# Patient Record
Sex: FEMALE | Race: WHITE | NOT HISPANIC OR LATINO | Employment: OTHER | URBAN - METROPOLITAN AREA
[De-identification: names, ages, dates, MRNs, and addresses within clinical notes are randomized per-mention and may not be internally consistent; named-entity substitution may affect disease eponyms.]

---

## 2024-07-11 ENCOUNTER — EVALUATION (OUTPATIENT)
Dept: PHYSICAL THERAPY | Facility: REHABILITATION | Age: 79
End: 2024-07-11
Payer: COMMERCIAL

## 2024-07-11 DIAGNOSIS — R35.0 URINARY FREQUENCY: ICD-10-CM

## 2024-07-11 DIAGNOSIS — M62.81 MUSCLE WEAKNESS: ICD-10-CM

## 2024-07-11 DIAGNOSIS — R39.15 URINARY URGENCY: Primary | ICD-10-CM

## 2024-07-11 PROCEDURE — 97161 PT EVAL LOW COMPLEX 20 MIN: CPT | Performed by: PHYSICAL THERAPIST

## 2024-07-11 NOTE — LETTER
2024    Tisha Ely MD  [address] [facility]  [address]  [facility] [address]    Patient: Shayla Philip   YOB: 1945   Date of Visit: 2024     Encounter Diagnosis     ICD-10-CM    1. Urinary urgency  R39.15       2. Urinary frequency  R35.0       3. Muscle weakness  M62.81           Dear Dr. Ely:    Thank you for your recent referral of Shayla Philip. Please review the attached evaluation summary from Shayla's recent visit.     Please verify that you agree with the plan of care by signing the attached order.     If you have any questions or concerns, please do not hesitate to call.     I sincerely appreciate the opportunity to share in the care of one of your patients and hope to have another opportunity to work with you in the near future.       Sincerely,    Nelsy Mckoy, PT      Referring Provider:      I certify that I have read the below Plan of Care and certify the need for these services furnished under this plan of treatment while under my care.                    Tisha Ely MD  [address] [place] [id number]  Via Fax: [phone]          PT Evaluation     Today's date: 2024  Patient name: Shayla Philip  : 1945  MRN: [id number]  Referring provider: Nelsy Mckoy, PT  Dx:   Encounter Diagnosis     ICD-10-CM    1. Urinary urgency  R39.15       2. Urinary frequency  R35.0       3. Muscle weakness  M62.81           Start Time: 1103  Stop Time: 1159  Total time in clinic (min): 56 minutes    Assessment  Impairments: impaired balance, impaired physical strength, lacks appropriate home exercise program and pain with function  Symptom irritability: moderate    Assessment details: Patient is a 79 y.o. female presenting to initial examination direct access with chief complaint of urinary urgency and frequency. Internal assessment will be deferred and performed at a later visit  secondary to time constraints. Functional impairments  include urinary urgency, urinary frequency and nocturia. Physical findings include proximal hip weakness, post pelvic floor and OI tenderness. Remaining physical assessment will be completed at a later visit. Patient would benefit from formal physical therapy to address impairments as detailed, decrease pain, and restore maximal level of function for all home and mobility tasks. Educated patient regarding plan of care and answered all patient questions to patient satisfaction. Thank you for this referral.    Understanding of Dx/Px/POC: good     Prognosis: good    Goals  Short term goals:  Pt will be able to delay urge at least 5 minutes in 6 weeks.  Pt will demonstrate good understanding of urge delay techniques in 6 weeks  Pt will extend intervoid period to 2-4 hours in 6 weeks.    Long term goals:  Pt will be compliant with HEP by discharge.  Pt will be able to delay urge for greater than 15 minutes to allow pt to walk in her neighborhood x20 mins for CV endurance and exercise purposes  Pt will present with nocturia 3 times a night or less   Pt will present with MMT 4/5 b/l LE all planes      Plan  Patient would benefit from: skilled physical therapy  Planned modality interventions: biofeedback, cryotherapy and thermotherapy: hydrocollator packs    Planned therapy interventions: joint mobilization, manual therapy, neuromuscular re-education, strengthening, stretching, therapeutic activities, therapeutic exercise, flexibility, functional ROM exercises, abdominal trunk stabilization and balance    Frequency: 1-2x week  Treatment plan discussed with: patient  Plan details: Cont per POC. PT issued bladder diary for pt to document bladder patterns. Pt will initiate treatment NV. PT will send POC to pt's PCP as well as urogynecologist for signature.       PT Pelvic Floor Subjective:   History of Present Illness:   See medication list scanned in media.   Pt goes to [place] in 1 week.     Pt comes to [facility] for urinary  frequency, nocturia, urinary urgency, pelvic pain after a BM. Pt denies urinary incontinence.   Pt has been dealing with symptoms 2 years.  Unsure of cause.   Pt has bladder stimulator, placed last October. She is unsure if it has helped. This was done through [facility].     Pelvic pain at worst: 6/10, after a BM. Will linger 45 mins until her medication works, pyridium    Pt rpeorts R pelvic floor tendrnes during internal assessment with urogyn.  Pt is concerned about her ability to make progress.      Social Support:     Lives in:  Multiple-level home    Lives with:  Spouse    Relationship status: /committed    Work status: retired    Life stress severity: moderate    History of Depression: yes    takes an anxiety pill  Diet and Exercise:    Diet:balanced nutrition    Pt was walking until all of thi happened. If she was away from home she would have to pee so she stopped. She would like to return to walking.   OB/ gyn History    Gestational History:     Prior Pregnancy: Yes      Number of prior pregnancies: 3    Number of term pregnancies: 3    Delivery Type: vaginal delivery      Menstrual History:      Menopausal: menopause  Bladder Function:     Voiding Difficulties positive for: urgency (when away from the house, in the car, at a restaurat), frequent urination, hesitancy, straining and incomplete emptying       Voiding Difficulties comments:     Voiding frequency: every 1-2 hours and every 15-30 minutes    Urinary leakage: no urine leakage    Urinary leakage not aggravated by: post-void dribble    Nocturia (episodes per night): 4 or more (5-9 times a night)    Painful urination: Yes (when I stop peeing she will feel discomfort in her lower pelvis)      Intake (ounces): Water: 8, Tea: 8,     Intake (ounces) comment: If I drink a lot I pee a lot   Bowel Function:     Bowel Function comments:  Pt takes metamucil every 3 days.    Bowel frequency: daily    [place] Stool Scale: type 1, type 5 and type 7    Stool  "softener use: no stool softeners    Enema use: no enema    Uses \"squatty potty\": no Squatty Potty  Sexual Function:     Sexually Active:  Not sexually active  Diagnostic Tests: Pt reports diagnostic testing has been completed 2 years ago, unsure of what:  Treatments:     Current treatment: physical therapy    Patient Goals:     Other patient goals:  Get back to life and not pee all the time or have pain      Objective    Pt provided verbal consent prior to and throughout objective assessment     Posture: nv    Tenderness:  Piriformis:   Obturator Internus: pos b/l  Post pelvic floor: pos b/l  Adductors:    Lumbar AROM - nv     SLS  L: poor load transfer, ipsilateral lean  R: poor load transfer, ipsilateral lean    Other Comments: - nv    MMT:   Left Right   Hip flex 3+ 4-   Hip ABD 3+ 4-   Hip ext 3 3+   Hip IR 4- 4+   Hip ER 4- 4+   Knee flex 4+ 4+   Knee ext 4+ 4+   Ankle DF 4+ 4+               Special Tests: nv   Left Right   JANELLE     FADIR     Post Thigh Thrust     Gillets     ASLR     SI compression     SI distraction                         Precautions: hypertension  Impairments/functional limitations: urinary urgency, urinary frequency, nocturia   Daily Treatment Diary    Manuals 7/11         External assess Prn in future visit         Internal assess Prn in future visit         Objective assessment Complete in next 1-2 visits                   Neuro Re-Ed          TAC          PF          TAC+PF          TAC with march          TAC with alt knee fall out          TAC with ball squeeze          TAC with SLR          TAC with s/l ABD          TAC with bridge                              360 deg breathing          Ther Ex                                                  Seated hs stretch          Seated piri stretch          Supine butterfly stretch                                                  Ther Activity          Bladder Diary  issued         Urge delay:QF nv         Urge delay: HR nv         Freeze, " breathe, squeeze  nv         The chip                     Gait Training                              Modalities

## 2024-07-11 NOTE — LETTER
2024    Apoorva CostaVINEET pisano  [address] [facility] [address]  [facility] [address]    Patient: Shayla Philip   YOB: 1945   Date of Visit: 2024     Encounter Diagnosis     ICD-10-CM    1. Urinary urgency  R39.15       2. Urinary frequency  R35.0       3. Muscle weakness  M62.81           Dear Dr. Costa:    Thank you for your recent referral of Shayla Philip. Please review the attached evaluation summary from Shayla's recent visit.     Please verify that you agree with the plan of care by signing the attached order.     If you have any questions or concerns, please do not hesitate to call.     I sincerely appreciate the opportunity to share in the care of one of your patients and hope to have another opportunity to work with you in the near future.       Sincerely,    Nelsy Mckoy, PT      Referring Provider:      I certify that I have read the below Plan of Care and certify the need for these services furnished under this plan of treatment while under my care.                    VINEET Franco  [address] [facility] [address]  [facility] [address]  Via Fax: [phone]          PT Evaluation     Today's date: 2024  Patient name: Shayla Philip  : 1945  MRN: [id number]  Referring provider: Nelsy Mckoy, PT  Dx:   Encounter Diagnosis     ICD-10-CM    1. Urinary urgency  R39.15       2. Urinary frequency  R35.0       3. Muscle weakness  M62.81           Start Time: 1103  Stop Time: 1159  Total time in clinic (min): 56 minutes    Assessment  Impairments: impaired balance, impaired physical strength, lacks appropriate home exercise program and pain with function  Symptom irritability: moderate    Assessment details: Patient is a 79 y.o. female presenting to initial examination direct access with chief complaint of urinary urgency and frequency. Internal assessment will be deferred and performed at a later visit  secondary to time constraints. Functional  impairments include urinary urgency, urinary frequency and nocturia. Physical findings include proximal hip weakness, post pelvic floor and OI tenderness. Remaining physical assessment will be completed at a later visit. Patient would benefit from formal physical therapy to address impairments as detailed, decrease pain, and restore maximal level of function for all home and mobility tasks. Educated patient regarding plan of care and answered all patient questions to patient satisfaction. Thank you for this referral.    Understanding of Dx/Px/POC: good     Prognosis: good    Goals  Short term goals:  Pt will be able to delay urge at least 5 minutes in 6 weeks.  Pt will demonstrate good understanding of urge delay techniques in 6 weeks  Pt will extend intervoid period to 2-4 hours in 6 weeks.    Long term goals:  Pt will be compliant with HEP by discharge.  Pt will be able to delay urge for greater than 15 minutes to allow pt to walk in her neighborhood x20 mins for CV endurance and exercise purposes  Pt will present with nocturia 3 times a night or less   Pt will present with MMT 4/5 b/l LE all planes      Plan  Patient would benefit from: skilled physical therapy  Planned modality interventions: biofeedback, cryotherapy and thermotherapy: hydrocollator packs    Planned therapy interventions: joint mobilization, manual therapy, neuromuscular re-education, strengthening, stretching, therapeutic activities, therapeutic exercise, flexibility, functional ROM exercises, abdominal trunk stabilization and balance    Frequency: 1-2x week  Treatment plan discussed with: patient  Plan details: Cont per POC. PT issued bladder diary for pt to document bladder patterns. Pt will initiate treatment NV. PT will send POC to pt's PCP as well as urogynecologist for signature.       PT Pelvic Floor Subjective:   History of Present Illness:   See medication list scanned in media.   Pt goes to [place] in 1 week.     Pt comes to [facility] for  urinary frequency, nocturia, urinary urgency, pelvic pain after a BM. Pt denies urinary incontinence.   Pt has been dealing with symptoms 2 years.  Unsure of cause.   Pt has bladder stimulator, placed last October. She is unsure if it has helped. This was done through [facility].     Pelvic pain at worst: 6/10, after a BM. Will linger 45 mins until her medication works, pyridium    Pt rpeorts R pelvic floor tendrnes during internal assessment with urogyn.  Pt is concerned about her ability to make progress.      Social Support:     Lives in:  Multiple-level home    Lives with:  Spouse    Relationship status: /committed    Work status: retired    Life stress severity: moderate    History of Depression: yes    takes an anxiety pill  Diet and Exercise:    Diet:balanced nutrition    Pt was walking until all of thi happened. If she was away from home she would have to pee so she stopped. She would like to return to walking.   OB/ gyn History    Gestational History:     Prior Pregnancy: Yes      Number of prior pregnancies: 3    Number of term pregnancies: 3    Delivery Type: vaginal delivery      Menstrual History:      Menopausal: menopause  Bladder Function:     Voiding Difficulties positive for: urgency (when away from the house, in the car, at a restaurat), frequent urination, hesitancy, straining and incomplete emptying       Voiding Difficulties comments:     Voiding frequency: every 1-2 hours and every 15-30 minutes    Urinary leakage: no urine leakage    Urinary leakage not aggravated by: post-void dribble    Nocturia (episodes per night): 4 or more (5-9 times a night)    Painful urination: Yes (when I stop peeing she will feel discomfort in her lower pelvis)      Intake (ounces): Water: 8, Tea: 8,     Intake (ounces) comment: If I drink a lot I pee a lot   Bowel Function:     Bowel Function comments:  Pt takes metamucil every 3 days.    Bowel frequency: daily    [place] Stool Scale: type 1, type 5 and type 7     "Stool softener use: no stool softeners    Enema use: no enema    Uses \"squatty potty\": no Squatty Potty  Sexual Function:     Sexually Active:  Not sexually active  Diagnostic Tests: Pt reports diagnostic testing has been completed 2 years ago, unsure of what:  Treatments:     Current treatment: physical therapy    Patient Goals:     Other patient goals:  Get back to life and not pee all the time or have pain      Objective    Pt provided verbal consent prior to and throughout objective assessment     Posture: nv    Tenderness:  Piriformis:   Obturator Internus: pos b/l  Post pelvic floor: pos b/l  Adductors:    Lumbar AROM - nv     SLS  L: poor load transfer, ipsilateral lean  R: poor load transfer, ipsilateral lean    Other Comments: - nv    MMT:   Left Right   Hip flex 3+ 4-   Hip ABD 3+ 4-   Hip ext 3 3+   Hip IR 4- 4+   Hip ER 4- 4+   Knee flex 4+ 4+   Knee ext 4+ 4+   Ankle DF 4+ 4+               Special Tests: nv   Left Right   JANELLE     FADIR     Post Thigh Thrust     Gillets     ASLR     SI compression     SI distraction                         Precautions: hypertension  Impairments/functional limitations: urinary urgency, urinary frequency, nocturia   Daily Treatment Diary    Manuals 7/11         External assess Prn in future visit         Internal assess Prn in future visit         Objective assessment Complete in next 1-2 visits                   Neuro Re-Ed          TAC          PF          TAC+PF          TAC with march          TAC with alt knee fall out          TAC with ball squeeze          TAC with SLR          TAC with s/l ABD          TAC with bridge                              360 deg breathing          Ther Ex                                                  Seated hs stretch          Seated piri stretch          Supine butterfly stretch                                                  Ther Activity          Bladder Diary  issued         Urge delay:QF nv         Urge delay: HR nv       "   Freeze, breathe, squeeze  nv         The chip                     Gait Training                              Modalities

## 2024-07-11 NOTE — LETTER
2024    Tisha Ely MD  [address] [facility]  [address]  [facility] [address]    Patient: Shayla Philip   YOB: 1945   Date of Visit: 2024     Encounter Diagnosis     ICD-10-CM    1. Urinary urgency  R39.15       2. Urinary frequency  R35.0       3. Muscle weakness  M62.81           Dear Dr. Ely:    Thank you for your recent referral of Shayla Philip. Please review the attached evaluation summary from Shayla's recent visit.     Please verify that you agree with the plan of care by signing the attached order.     If you have any questions or concerns, please do not hesitate to call.     I sincerely appreciate the opportunity to share in the care of one of your patients and hope to have another opportunity to work with you in the near future.       Sincerely,    Nelsy Mckoy, PT      Referring Provider:      I certify that I have read the below Plan of Care and certify the need for these services furnished under this plan of treatment while under my care.                    Tisha Ely MD  [address] [place] [address]  Via Fax: [phone]          PT Evaluation     Today's date: 2024  Patient name: Shayla Philip  : 1945  MRN: [id number]  Referring provider: Nelsy Mckoy, PT  Dx:   Encounter Diagnosis     ICD-10-CM    1. Urinary urgency  R39.15       2. Urinary frequency  R35.0       3. Muscle weakness  M62.81           Start Time: 1103  Stop Time: 1159  Total time in clinic (min): 56 minutes    Assessment  Impairments: impaired balance, impaired physical strength, lacks appropriate home exercise program and pain with function  Symptom irritability: moderate    Assessment details: Patient is a 79 y.o. female presenting to initial examination direct access with chief complaint of urinary urgency and frequency. Internal assessment will be deferred and performed at a later visit  secondary to time constraints. Functional impairments  include urinary urgency, urinary frequency and nocturia. Physical findings include proximal hip weakness, post pelvic floor and OI tenderness. Remaining physical assessment will be completed at a later visit. Patient would benefit from formal physical therapy to address impairments as detailed, decrease pain, and restore maximal level of function for all home and mobility tasks. Educated patient regarding plan of care and answered all patient questions to patient satisfaction. Thank you for this referral.    Understanding of Dx/Px/POC: good     Prognosis: good    Goals  Short term goals:  Pt will be able to delay urge at least 5 minutes in 6 weeks.  Pt will demonstrate good understanding of urge delay techniques in 6 weeks  Pt will extend intervoid period to 2-4 hours in 6 weeks.    Long term goals:  Pt will be compliant with HEP by discharge.  Pt will be able to delay urge for greater than 15 minutes to allow pt to walk in her neighborhood x20 mins for CV endurance and exercise purposes  Pt will present with nocturia 3 times a night or less   Pt will present with MMT 4/5 b/l LE all planes      Plan  Patient would benefit from: skilled physical therapy  Planned modality interventions: biofeedback, cryotherapy and thermotherapy: hydrocollator packs    Planned therapy interventions: joint mobilization, manual therapy, neuromuscular re-education, strengthening, stretching, therapeutic activities, therapeutic exercise, flexibility, functional ROM exercises, abdominal trunk stabilization and balance    Frequency: 1-2x week  Duration in weeks: 12  Treatment plan discussed with: patient  Plan details: Cont per POC. PT issued bladder diary for pt to document bladder patterns. Pt will initiate treatment NV. PT will send POC to pt's PCP as well as urogynecologist for signature.     PT Pelvic Floor Subjective:   History of Present Illness:   See medication list scanned in media.   Pt goes to [place] in 1 week.     Pt comes to  [facility] for urinary frequency, nocturia, urinary urgency, pelvic pain after a BM. Pt denies urinary incontinence.   Pt has been dealing with symptoms 2 years.  Unsure of cause.   Pt has bladder stimulator, placed last October. She is unsure if it has helped. This was done through [facility].     Pelvic pain at worst: 6/10, after a BM. Will linger 45 mins until her medication works, pyridium    Pt rpeorts R pelvic floor tendrnes during internal assessment with urogyn.  Pt is concerned about her ability to make progress.      Social Support:     Lives in:  Multiple-level home    Lives with:  Spouse    Relationship status: /committed    Work status: retired    Life stress severity: moderate    History of Depression: yes    takes an anxiety pill  Diet and Exercise:    Diet:balanced nutrition    Pt was walking until all of thi happened. If she was away from home she would have to pee so she stopped. She would like to return to walking.   OB/ gyn History    Gestational History:     Prior Pregnancy: Yes      Number of prior pregnancies: 3    Number of term pregnancies: 3    Delivery Type: vaginal delivery      Menstrual History:      Menopausal: menopause  Bladder Function:     Voiding Difficulties positive for: urgency (when away from the house, in the car, at a restaurat), frequent urination, hesitancy, straining and incomplete emptying       Voiding Difficulties comments:     Voiding frequency: every 1-2 hours and every 15-30 minutes    Urinary leakage: no urine leakage    Urinary leakage not aggravated by: post-void dribble    Nocturia (episodes per night): 4 or more (5-9 times a night)    Painful urination: Yes (when I stop peeing she will feel discomfort in her lower pelvis)      Intake (ounces): Water: 8, Tea: 8,     Intake (ounces) comment: If I drink a lot I pee a lot   Bowel Function:     Bowel Function comments:  Pt takes metamucil every 3 days.    Bowel frequency: daily    [place] Stool Scale: type 1, type 5 and  "type 7    Stool softener use: no stool softeners    Enema use: no enema    Uses \"squatty potty\": no Squatty Potty  Sexual Function:     Sexually Active:  Not sexually active  Diagnostic Tests: Pt reports diagnostic testing has been completed 2 years ago, unsure of what:  Treatments:     Current treatment: physical therapy    Patient Goals:     Other patient goals:  Get back to life and not pee all the time or have pain      Objective    Pt provided verbal consent prior to and throughout objective assessment     Posture: nv    Tenderness:  Piriformis:   Obturator Internus: pos b/l  Post pelvic floor: pos b/l  Adductors:    Lumbar AROM - nv     SLS  L: poor load transfer, ipsilateral lean  R: poor load transfer, ipsilateral lean    Other Comments: - nv    MMT:   Left Right   Hip flex 3+ 4-   Hip ABD 3+ 4-   Hip ext 3 3+   Hip IR 4- 4+   Hip ER 4- 4+   Knee flex 4+ 4+   Knee ext 4+ 4+   Ankle DF 4+ 4+               Special Tests: nv   Left Right   JANELLE     FADIR     Post Thigh Thrust     Gillets     ASLR     SI compression     SI distraction                         Precautions: hypertension  Impairments/functional limitations: urinary urgency, urinary frequency, nocturia   Daily Treatment Diary    Manuals 7/11         External assess Prn in future visit         Internal assess Prn in future visit         Objective assessment Complete in next 1-2 visits                   Neuro Re-Ed          TAC          PF          TAC+PF          TAC with march          TAC with alt knee fall out          TAC with ball squeeze          TAC with SLR          TAC with s/l ABD          TAC with bridge                              360 deg breathing          Ther Ex                                                  Seated hs stretch          Seated piri stretch          Supine butterfly stretch                                                  Ther Activity          Bladder Diary  issued         Urge delay:QF nv         Urge delay: HR nv    "      Freeze, breathe, squeeze  nv         The chip                     Gait Training                              Modalities

## 2024-07-11 NOTE — LETTER
2024    Apoorva CostaVINEET pisano  [address]  [facility] [address]    Patient: Shayla Philip   YOB: 1945   Date of Visit: 2024     Encounter Diagnosis     ICD-10-CM    1. Urinary urgency  R39.15       2. Urinary frequency  R35.0       3. Muscle weakness  M62.81           Dear Dr. Costa:    Thank you for your recent referral of Shayla Philip. Please review the attached evaluation summary from Shayla's recent visit.     Please verify that you agree with the plan of care by signing the attached order.     If you have any questions or concerns, please do not hesitate to call.     I sincerely appreciate the opportunity to share in the care of one of your patients and hope to have another opportunity to work with you in the near future.       Sincerely,    Nelsy Mckoy, PT      Referring Provider:      I certify that I have read the below Plan of Care and certify the need for these services furnished under this plan of treatment while under my care.                    VINEET Franco  [address]  [facility] [address]  Via Fax: [phone]          PT Evaluation     Today's date: 2024  Patient name: Shayla Philip  : 1945  MRN: [id number]  Referring provider: Nelsy Mckoy, PT  Dx:   Encounter Diagnosis     ICD-10-CM    1. Urinary urgency  R39.15       2. Urinary frequency  R35.0       3. Muscle weakness  M62.81           Start Time: 1103  Stop Time: 1159  Total time in clinic (min): 56 minutes    Assessment  Impairments: impaired balance, impaired physical strength, lacks appropriate home exercise program and pain with function  Symptom irritability: moderate    Assessment details: Patient is a 79 y.o. female presenting to initial examination direct access with chief complaint of urinary urgency and frequency. Internal assessment will be deferred and performed at a later visit  secondary to time constraints. Functional  impairments include urinary urgency, urinary frequency and nocturia. Physical findings include proximal hip weakness, post pelvic floor and OI tenderness. Remaining physical assessment will be completed at a later visit. Patient would benefit from formal physical therapy to address impairments as detailed, decrease pain, and restore maximal level of function for all home and mobility tasks. Educated patient regarding plan of care and answered all patient questions to patient satisfaction. Thank you for this referral.    Understanding of Dx/Px/POC: good     Prognosis: good    Goals  Short term goals:  Pt will be able to delay urge at least 5 minutes in 6 weeks.  Pt will demonstrate good understanding of urge delay techniques in 6 weeks  Pt will extend intervoid period to 2-4 hours in 6 weeks.    Long term goals:  Pt will be compliant with HEP by discharge.  Pt will be able to delay urge for greater than 15 minutes to allow pt to walk in her neighborhood x20 mins for CV endurance and exercise purposes  Pt will present with nocturia 3 times a night or less   Pt will present with MMT 4/5 b/l LE all planes      Plan  Patient would benefit from: skilled physical therapy  Planned modality interventions: biofeedback, cryotherapy and thermotherapy: hydrocollator packs    Planned therapy interventions: joint mobilization, manual therapy, neuromuscular re-education, strengthening, stretching, therapeutic activities, therapeutic exercise, flexibility, functional ROM exercises, abdominal trunk stabilization and balance    Frequency: 1-2x week  Duration in weeks: 12  Treatment plan discussed with: patient  Plan details: Cont per POC. PT issued bladder diary for pt to document bladder patterns. Pt will initiate treatment NV. PT will send POC to pt's PCP as well as urogynecologist for signature.     PT Pelvic Floor Subjective:   History of Present Illness:   See medication list scanned in media.   Pt goes to [place] in 1 week.      Pt comes to PFPT for urinary frequency, nocturia, urinary urgency, pelvic pain after a BM. Pt denies urinary incontinence.   Pt has been dealing with symptoms 2 years.  Unsure of cause.   Pt has bladder stimulator, placed last October. She is unsure if it has helped. This was done through [facility].     Pelvic pain at worst: 6/10, after a BM. Will linger 45 mins until her medication works, pyridium    Pt rpeorts R pelvic floor tendrnes during internal assessment with urogyn.  Pt is concerned about her ability to make progress.      Social Support:     Lives in:  Multiple-level home    Lives with:  Spouse    Relationship status: /committed    Work status: retired    Life stress severity: moderate    History of Depression: yes    takes an anxiety pill  Diet and Exercise:    Diet:balanced nutrition    Pt was walking until all of thi happened. If she was away from home she would have to pee so she stopped. She would like to return to walking.   OB/ gyn History    Gestational History:     Prior Pregnancy: Yes      Number of prior pregnancies: 3    Number of term pregnancies: 3    Delivery Type: vaginal delivery      Menstrual History:      Menopausal: menopause  Bladder Function:     Voiding Difficulties positive for: urgency (when away from the house, in the car, at a restaurat), frequent urination, hesitancy, straining and incomplete emptying       Voiding Difficulties comments:     Voiding frequency: every 1-2 hours and every 15-30 minutes    Urinary leakage: no urine leakage    Urinary leakage not aggravated by: post-void dribble    Nocturia (episodes per night): 4 or more (5-9 times a night)    Painful urination: Yes (when I stop peeing she will feel discomfort in her lower pelvis)      Intake (ounces): Water: 8, Tea: 8,     Intake (ounces) comment: If I drink a lot I pee a lot   Bowel Function:     Bowel Function comments:  Pt takes metamucil every 3 days.    Bowel frequency: daily    Ingham Stool Scale:  "type 1, type 5 and type 7    Stool softener use: no stool softeners    Enema use: no enema    Uses \"squatty potty\": no Squatty Potty  Sexual Function:     Sexually Active:  Not sexually active  Diagnostic Tests: Pt reports diagnostic testing has been completed 2 years ago, unsure of what:  Treatments:     Current treatment: physical therapy    Patient Goals:     Other patient goals:  Get back to life and not pee all the time or have pain      Objective    Pt provided verbal consent prior to and throughout objective assessment     Posture: nv    Tenderness:  Piriformis:   Obturator Internus: pos b/l  Post pelvic floor: pos b/l  Adductors:    Lumbar AROM - nv     SLS  L: poor load transfer, ipsilateral lean  R: poor load transfer, ipsilateral lean    Other Comments: - nv    MMT:   Left Right   Hip flex 3+ 4-   Hip ABD 3+ 4-   Hip ext 3 3+   Hip IR 4- 4+   Hip ER 4- 4+   Knee flex 4+ 4+   Knee ext 4+ 4+   Ankle DF 4+ 4+               Special Tests: nv   Left Right   JANELLE     FADIR     Post Thigh Thrust     Gillets     ASLR     SI compression     SI distraction                         Precautions: hypertension  Impairments/functional limitations: urinary urgency, urinary frequency, nocturia   Daily Treatment Diary    Manuals 7/11         External assess Prn in future visit         Internal assess Prn in future visit         Objective assessment Complete in next 1-2 visits                   Neuro Re-Ed          TAC          PF          TAC+PF          TAC with march          TAC with alt knee fall out          TAC with ball squeeze          TAC with SLR          TAC with s/l ABD          TAC with bridge                              360 deg breathing          Ther Ex                                                  Seated hs stretch          Seated piri stretch          Supine butterfly stretch                                                  Ther Activity          Bladder Diary  issued         Urge delay:QF nv       "   Urge delay: HR nv         Freeze, breathe, squeeze  nv         The knack                     Gait Training                              Modalities

## 2024-07-11 NOTE — PROGRESS NOTES
PT Evaluation     Today's date: 2024  Patient name: Shayla Philip  : 1945  MRN: [id number]  Referring provider: Nelsy Mckoy, PT  Dx:   Encounter Diagnosis     ICD-10-CM    1. Urinary urgency  R39.15       2. Urinary frequency  R35.0       3. Muscle weakness  M62.81           Start Time: 1103  Stop Time: 1159  Total time in clinic (min): 56 minutes    Assessment  Impairments: impaired balance, impaired physical strength, lacks appropriate home exercise program and pain with function  Symptom irritability: moderate    Assessment details: Patient is a 79 y.o. female presenting to initial examination direct access with chief complaint of urinary urgency and frequency. Internal assessment will be deferred and performed at a later visit  secondary to time constraints. Functional impairments include urinary urgency, urinary frequency and nocturia. Physical findings include proximal hip weakness, post pelvic floor and OI tenderness. Remaining physical assessment will be completed at a later visit. Patient would benefit from formal physical therapy to address impairments as detailed, decrease pain, and restore maximal level of function for all home and mobility tasks. Educated patient regarding plan of care and answered all patient questions to patient satisfaction. Thank you for this referral.    Understanding of Dx/Px/POC: good     Prognosis: good    Goals  Short term goals:  Pt will be able to delay urge at least 5 minutes in 6 weeks.  Pt will demonstrate good understanding of urge delay techniques in 6 weeks  Pt will extend intervoid period to 2-4 hours in 6 weeks.    Long term goals:  Pt will be compliant with HEP by discharge.  Pt will be able to delay urge for greater than 15 minutes to allow pt to walk in her neighborhood x20 mins for CV endurance and exercise purposes  Pt will present with nocturia 3 times a night or less   Pt will present with MMT 4/5 b/l LE all planes      Plan  Patient would  benefit from: skilled physical therapy  Planned modality interventions: biofeedback, cryotherapy and thermotherapy: hydrocollator packs    Planned therapy interventions: joint mobilization, manual therapy, neuromuscular re-education, strengthening, stretching, therapeutic activities, therapeutic exercise, flexibility, functional ROM exercises, abdominal trunk stabilization and balance    Frequency: 1-2x week  Duration in weeks: 12  Treatment plan discussed with: patient  Plan details: Cont per POC. PT issued bladder diary for pt to document bladder patterns. Pt will initiate treatment NV. PT will send POC to pt's PCP as well as urogynecologist for signature.     PT Pelvic Floor Subjective:   History of Present Illness:   See medication list scanned in media.   Pt goes to [place] in 1 week.     Pt comes to PFPT for urinary frequency, nocturia, urinary urgency, pelvic pain after a BM. Pt denies urinary incontinence.   Pt has been dealing with symptoms 2 years.  Unsure of cause.   Pt has bladder stimulator, placed last October. She is unsure if it has helped. This was done through [facility].     Pelvic pain at worst: 6/10, after a BM. Will linger 45 mins until her medication works, pyridium    Pt rpeorts R pelvic floor tendrnes during internal assessment with urogyn.  Pt is concerned about her ability to make progress.      Social Support:     Lives in:  Multiple-level home    Lives with:  Spouse    Relationship status: /committed    Work status: retired    Life stress severity: moderate    History of Depression: yes    takes an anxiety pill  Diet and Exercise:    Diet:balanced nutrition    Pt was walking until all of thi happened. If she was away from home she would have to pee so she stopped. She would like to return to walking.   OB/ gyn History    Gestational History:     Prior Pregnancy: Yes      Number of prior pregnancies: 3    Number of term pregnancies: 3    Delivery Type: vaginal delivery      Menstrual  "History:      Menopausal: menopause  Bladder Function:     Voiding Difficulties positive for: urgency (when away from the house, in the car, at a restaurat), frequent urination, hesitancy, straining and incomplete emptying       Voiding Difficulties comments:     Voiding frequency: every 1-2 hours and every 15-30 minutes    Urinary leakage: no urine leakage    Urinary leakage not aggravated by: post-void dribble    Nocturia (episodes per night): 4 or more (5-9 times a night)    Painful urination: Yes (when I stop peeing she will feel discomfort in her lower pelvis)      Intake (ounces): Water: 8, Tea: 8,     Intake (ounces) comment: If I drink a lot I pee a lot   Bowel Function:     Bowel Function comments:  Pt takes metamucil every 3 days.    Bowel frequency: daily    Robertson Stool Scale: type 1, type 5 and type 7    Stool softener use: no stool softeners    Enema use: no enema    Uses \"squatty potty\": no Squatty Potty  Sexual Function:     Sexually Active:  Not sexually active  Diagnostic Tests: Pt reports diagnostic testing has been completed 2 years ago, unsure of what:  Treatments:     Current treatment: physical therapy    Patient Goals:     Other patient goals:  Get back to life and not pee all the time or have pain      Objective    Pt provided verbal consent prior to and throughout objective assessment     Posture: nv    Tenderness:  Piriformis:   Obturator Internus: pos b/l  Post pelvic floor: pos b/l  Adductors:    Lumbar AROM - nv     SLS  L: poor load transfer, ipsilateral lean  R: poor load transfer, ipsilateral lean    Other Comments: - nv    MMT:   Left Right   Hip flex 3+ 4-   Hip ABD 3+ 4-   Hip ext 3 3+   Hip IR 4- 4+   Hip ER 4- 4+   Knee flex 4+ 4+   Knee ext 4+ 4+   Ankle DF 4+ 4+               Special Tests: nv   Left Right   JANELLE     FADIR     Post Thigh Thrust     Gillets     ASLR     SI compression     SI distraction                         Precautions: hypertension  Impairments/functional " limitations: urinary urgency, urinary frequency, nocturia   Daily Treatment Diary    Manuals 7/11         External assess Prn in future visit         Internal assess Prn in future visit         Objective assessment Complete in next 1-2 visits                   Neuro Re-Ed          TAC          PF          TAC+PF          TAC with march          TAC with alt knee fall out          TAC with ball squeeze          TAC with SLR          TAC with s/l ABD          TAC with bridge                              360 deg breathing          Ther Ex                                                  Seated hs stretch          Seated piri stretch          Supine butterfly stretch                                                  Ther Activity          Bladder Diary  issued         Urge delay:QF nv         Urge delay: HR nv         Freeze, breathe, squeeze  nv         [email or web address]                     Gait Training                              Modalities

## 2024-07-15 ENCOUNTER — OFFICE VISIT (OUTPATIENT)
Dept: PHYSICAL THERAPY | Facility: REHABILITATION | Age: 79
End: 2024-07-15
Payer: COMMERCIAL

## 2024-07-15 DIAGNOSIS — M62.81 MUSCLE WEAKNESS: ICD-10-CM

## 2024-07-15 DIAGNOSIS — R35.0 URINARY FREQUENCY: ICD-10-CM

## 2024-07-15 DIAGNOSIS — R39.15 URINARY URGENCY: Primary | ICD-10-CM

## 2024-07-15 PROCEDURE — 97530 THERAPEUTIC ACTIVITIES: CPT | Performed by: PHYSICAL THERAPIST

## 2024-07-15 PROCEDURE — 97112 NEUROMUSCULAR REEDUCATION: CPT | Performed by: PHYSICAL THERAPIST

## 2024-07-15 NOTE — PROGRESS NOTES
Daily Note     Today's date: 7/15/2024  Patient name: Shayla Philip  : 1945  MRN: [id number]  Referring provider: Nelsy Mckoy, PT  Dx:   Encounter Diagnosis     ICD-10-CM    1. Urinary urgency  R39.15       2. Urinary frequency  R35.0       3. Muscle weakness  M62.81           Start Time: 1135  Stop Time: 1230  Total time in clinic (min): 55 minutes    Subjective: Pt comes to PT with bladder diary. She reports she is embarrassed to hand it to me because she worries it is confusing. She reports she has not been sleeping well and has had some pain. She has been doing a lot of work getting ready to travel which may be why she is in more pain.       Objective: See treatment diary below      Assessment: Pt presents with total number of voids in a day ranging from 8-15 times a day with nightime voids of 5-7 times a night. Pt has intervoid interval ranging from 30 mins to 5 hours. Pt experienced strong urinary urge for nighttime voids and occasionally during the day related to inactivity or washing dishes as well as first thing in the morning. PT cued pt on maintaining 2-4 hour intervoid interval during the day and utilize urge deferral strategy with FBS to assure pt voids at a low urge. Pt to read strategy daily for repeated exposure to improve improve pt's ability to successfully use strategy as needed. PT also cued pt on healthy bladder habits including sipping versus chugging water, spacing out water during the day and minimizing voiding JIC. Pt demo ability to perform fair PFM contraction, compensates with glutes, improved with cueing. PT also cued pt for 360 deg breathing without chest elevation. Tolerated treatment well. Patient would benefit from continued PT      Plan: Continue per plan of care.      Precautions: hypertension  Impairments/functional limitations: urinary urgency, urinary frequency, nocturia   Daily Treatment Diary    Manuals 7/11 7/15        External assess Prn in future visit          Internal assess Prn in future visit         Objective assessment Complete in next 1-2 visits Next visit                   Neuro Re-Ed          TAC          PF  Review, QF        TAC+PF          TAC with march          TAC with alt knee fall out          TAC with ball squeeze            TAC with SLR          TAC with s/l ABD          TAC with bridge                              360 deg breathing  Review, issued         Ther Ex                                                  Seated hs stretch          Seated piri stretch          Supine butterfly stretch                                                  Ther Activity          Bladder Diary  issued reviewed        Urge delay:QF nv review        Urge delay: HR nv review        Freeze, breathe, squeeze  nv review        [employer]                     Gait Training                              Modalities

## 2024-07-31 ENCOUNTER — OFFICE VISIT (OUTPATIENT)
Dept: PHYSICAL THERAPY | Facility: REHABILITATION | Age: 79
End: 2024-07-31
Payer: COMMERCIAL

## 2024-07-31 DIAGNOSIS — R39.15 URINARY URGENCY: Primary | ICD-10-CM

## 2024-07-31 DIAGNOSIS — M62.81 MUSCLE WEAKNESS: ICD-10-CM

## 2024-07-31 DIAGNOSIS — R35.0 URINARY FREQUENCY: ICD-10-CM

## 2024-07-31 PROCEDURE — 97530 THERAPEUTIC ACTIVITIES: CPT | Performed by: PHYSICAL THERAPIST

## 2024-07-31 PROCEDURE — 97110 THERAPEUTIC EXERCISES: CPT | Performed by: PHYSICAL THERAPIST

## 2024-07-31 PROCEDURE — 97112 NEUROMUSCULAR REEDUCATION: CPT | Performed by: PHYSICAL THERAPIST

## 2024-07-31 NOTE — PROGRESS NOTES
"Daily Note     Today's date: 2024  Patient name: Shayla Philip  : 1945  MRN: [id number]  Referring provider: Nelsy Mckoy, PT  Dx:   Encounter Diagnosis     ICD-10-CM    1. Urinary urgency  R39.15       2. Urinary frequency  R35.0       3. Muscle weakness  M62.81           Start Time: 1134  Stop Time: 1232  Total time in clinic (min): 58 minutes    Subjective: Pt reports she is \"not good.\" Pt was supposed to go to [place] but her  was having dizzy spells so they could not go on their trip. She reports her urge deferral strategy work 40-50% of the time. She has been having some painful urges and she has to void and it has been 2 hours but other times it is after only a short period of time and she has to go to the bathroom. She notices her pain in her vaginal area is better during the day because she can breathe and do her stretches to manage it. If she gets involved in quilting or making a greeting card, she can alleviate her pain. At night she has more pain and urgency and has to get up a lot in the night, sometimes 10 times a night. Not every night, just some nights. She has had 1 night when she peed 2 times a night recently. Her goal is 3 times a night.   Her symptoms of pain/discomfort and urgency are worse when she is constipated. She takes metamucil every 2-3 days and it works for 1 day but then she has days of constipation. Pt was prescribed something by her physician but has not started using it yet.       Objective: See treatment diary below    Special Tests: nv    Left Right   JANELLE  -  -   FADIR  -  -   Post Thigh Thrust  -  -     Pop angle  51 deg   56 deg    Hip IR PROM  38 deg  22 deg           Assessment: PT cued pt on urge deferral strategy specifically with focus on freezing including down-regulating nervous system, deep breathing, confident thinking about her ability to control the urge, and distraction techniques. PT initiated pain neuroscience education with focus on re " framing the pain she is experienced in context to the situation she is and again using strategies to down regulate her nervous system to manage her pain response. PT also initiated LE stretches this session with focus on PFM relaxation and deep breathing to decrease overall PFM tension. Pt will benefit from internal assessment NV to assess for PFM tension that may be contributing to her pain and urgency. Pt will also benefit from progression of POC to include strengthening of proximal hips to offload PFM. Tolerated treatment well. Patient would benefit from continued PT      Plan: Continue per plan of care.     Access Code: [email or web address]  URL: [email or web address]  Date: 07/31/2024  Prepared by: Nelsy Mckoy    Program Notes  Perform deep breathing with all stretches    Exercises  - Seated Hamstring Stretch  - 2 x daily - 2 reps - 30 secs hold  - Seated Piriformis Stretch  - 2 x daily - 2 reps - 30 secs hold  - Seated Happy Baby With Trunk Flexion For Pelvic Relaxation  - 2 x daily - 5-10 reps - 5 secs hold  - Supine Butterfly Groin Stretch  - 2 x daily - 1 reps - 30-60 secs hold  - Supine Hip Internal and External Rotation  - 2 x daily - 10 reps - 5-10 secs hold     Precautions: hypertension  Impairments/functional limitations: urinary urgency, urinary frequency, nocturia   Daily Treatment Diary    Manuals 7/11 7/15 7/31       External assess Prn in future visit  nv       Internal assess Prn in future visit  nv       Objective assessment Complete in next 1-2 visits Next visit                   Neuro Re-Ed          TAC          PF  Review, QF        TAC+PF          TAC with march          TAC with alt knee fall out          TAC with ball squeeze            TAC with SLR          TAC with s/l ABD          TAC with bridge                    Pain neuroscience education   Review        360 deg breathing  Review, issued  Review, with stretches       Ther Ex                                        Seated happy  "baby    5x5       Seated hs stretch   30\"x2 ea       Seated piri stretch   30\"x2 ea       Supine butterfly stretch   1 min       Supine hip IR/ER AROM   5\"x5 ea                                     Ther Activity          Bladder Diary  issued reviewed        Urge delay:QF nv review review       Urge delay: HR nv review review       Freeze, breathe, squeeze  nv review Review        The chip           Relaxation based strategies    Deep breathing        Bowel mechanics/posture          Gait Training                              Modalities                                          "

## 2024-08-02 ENCOUNTER — OFFICE VISIT (OUTPATIENT)
Dept: PHYSICAL THERAPY | Facility: REHABILITATION | Age: 79
End: 2024-08-02
Payer: COMMERCIAL

## 2024-08-02 DIAGNOSIS — R39.15 URINARY URGENCY: Primary | ICD-10-CM

## 2024-08-02 DIAGNOSIS — R35.0 URINARY FREQUENCY: ICD-10-CM

## 2024-08-02 DIAGNOSIS — M62.81 MUSCLE WEAKNESS: ICD-10-CM

## 2024-08-02 PROCEDURE — 97140 MANUAL THERAPY 1/> REGIONS: CPT | Performed by: PHYSICAL THERAPIST

## 2024-08-02 PROCEDURE — 97110 THERAPEUTIC EXERCISES: CPT | Performed by: PHYSICAL THERAPIST

## 2024-08-02 NOTE — PROGRESS NOTES
Daily Note     Today's date: 2024  Patient name: Shayla Philip  : 1945  MRN: [id number]  Referring provider: Nelsy Mckoy, PT  Dx:   Encounter Diagnosis     ICD-10-CM    1. Urinary urgency  R39.15       2. Urinary frequency  R35.0       3. Muscle weakness  M62.81           Start Time: 1110  Stop Time: 1210  Total time in clinic (min): 60 minutes    Subjective: Pt reports she did her stretches 3x yesterday and reports the day went pretty well and at night she only went 5 times which was a good night for her. She was busy during the day and able to delay her urge pretty well. Pt reports when she gets her abdomen pain it will feel like something is pulling.       Objective: See treatment diary below    Pt provided consent prior to and throughout initiation of external/internal assessment  Pt declined second person in the room      Position: supine exam      General Perineum Exam:   Perineum intact:Y  Pelvic organ prolapse at rest: N       Visual Inspection of Perineum:   Excursion of perineal body in cephalad direction with contraction of pelvic floor muscles (PFM): GOOD  Excursion of perineal body in caudal direction with relaxation of pelvic floor muscles (PFM): GOOD  Involuntary relaxation with bearing down: GOOD    Involuntary contraction with coughing: N, able with cueing  Anal Warriormine: present  Cotton swab test: non-tender  Sensation: intact     Pelvic Organ Prolapse   Position: hook-lying  At rest: none  With bearing down: not assessed     Pelvic Floor Muscle Exam     Muscle Contraction:   Breathing pattern with contraction:   Pelvic floor muscle relaxation:     PERFECT Score   Power right:1  Power left: 1       Tenderness to palpation:  Layer 1: bulbospongiosus, ischiocavernosus L side, 3-6 o'clock  Layer 2: periurethral muscles L>R  Layer 3: OI, coccygeus, iliococcygeus L>R    PT performed internal manual therapy without worsening of symptoms during manual stretch. Pt did reports general soreness  "afterwards, may be related to full evaluation    Assessment: PT performed internal assessment as detailed above as well as internal manual therapy to 1st layer muscles. Pt experienced fair response to internal manual therapy. Pt experienced mild worsening of symptoms during manual stretch however alerted PT and PT was able to adjust pressure to avoid worsening of symptoms. PT imitated self internal STM for pt to perform independently to 1st layer muscles to address tension. Pt will benefit from deeper layers PFM stretches as well however this may be addressed at a future visit. Tolerated treatment well. Patient would benefit from continued PT      Plan: Continue per plan of care.      Precautions: hypertension   Impairments/functional limitations: urinary urgency, urinary frequency, nocturia   Daily Treatment Diary    Manuals 7/11 7/15 7/31 8/2      External assess Prn in future visit  nv performed      Internal assess Prn in future visit  nv performed      Internal manual therapy    20 mins, layer 1       Objective assessment Complete in next 1-2 visits Next visit                   Neuro Re-Ed          TAC          PF  Review, QF        TAC+PF          TAC with march          TAC with alt knee fall out          TAC with ball squeeze            TAC with SLR          TAC with s/l ABD          TAC with bridge                    Pain neuroscience education   Review        360 deg breathing  Review, issued  Review, with stretches Review, with self stretching      Ther Ex                                        Seated happy baby    5x5 HEP      Seated hs stretch   30\"x2 ea HEP      Seated piri stretch   30\"x2 ea HEP      Supine butterfly stretch   1 min HEP      Supine hip IR/ER AROM   5\"x5 ea HEP      Self internal STM     Review, issued, layer 1                           Ther Activity          Bladder Diary  issued reviewed        Urge delay:QF nv review review       Urge delay: HR nv review review       Freeze, " breathe, squeeze  nv review Review        The chip           Relaxation based strategies    Deep breathing        Bowel mechanics/posture    nv      Gait Training                              Modalities

## 2024-08-06 ENCOUNTER — APPOINTMENT (OUTPATIENT)
Dept: PHYSICAL THERAPY | Facility: REHABILITATION | Age: 79
End: 2024-08-06
Payer: COMMERCIAL

## 2024-08-07 ENCOUNTER — OFFICE VISIT (OUTPATIENT)
Dept: PHYSICAL THERAPY | Facility: REHABILITATION | Age: 79
End: 2024-08-07
Payer: COMMERCIAL

## 2024-08-07 DIAGNOSIS — R35.0 URINARY FREQUENCY: ICD-10-CM

## 2024-08-07 DIAGNOSIS — M62.81 MUSCLE WEAKNESS: ICD-10-CM

## 2024-08-07 DIAGNOSIS — R39.15 URINARY URGENCY: Primary | ICD-10-CM

## 2024-08-07 PROCEDURE — 97530 THERAPEUTIC ACTIVITIES: CPT | Performed by: PHYSICAL THERAPIST

## 2024-08-07 PROCEDURE — 97112 NEUROMUSCULAR REEDUCATION: CPT | Performed by: PHYSICAL THERAPIST

## 2024-08-07 PROCEDURE — 97110 THERAPEUTIC EXERCISES: CPT | Performed by: PHYSICAL THERAPIST

## 2024-08-07 NOTE — PROGRESS NOTES
Daily Note     Today's date: 2024  Patient name: Shayla Philip  : 1945  MRN: [id number]  Referring provider: Nelsy Mckoy, PT  Dx:   Encounter Diagnosis     ICD-10-CM    1. Urinary urgency  R39.15       2. Urinary frequency  R35.0       3. Muscle weakness  M62.81           Start Time: 1033  Stop Time: 1127  Total time in clinic (min): 54 minutes    Subjective: She thinks her days are getting better. She does not have much pain and if she does it is always after a BM. By about 8 pm she starts voiding more frequently and she is not sure why. She goes to bed at 11 pm.  She does not eat or drink after dinner, She eats dinner around 6 pm. If she does she will have ice cream, milk/cookies, or PB sandwich. Pt takes something to sleep, one night she takes benadryl, one night she takes a melatonin, or an allergy pill. Pt is not trying her urge deferral strategy at night because she wants to go to sleep. Pt is doing her internal stretches 1x/day without inc in pain but is concerned she is doing them right. PT cued pton correct technique      Objective: See treatment diary below      Assessment: PT cued pt on healthy bladder habits including inc water intake after dinner to decrease urinary urgency/frequency. Pt verbalized understanding of this. PT also cued pt to trial urge deferral strategy during the night hours as they are successful for her during the day to manage her pain and urinary frequency. Pt will consider this but is hesitant secondary to desire to sleep. Patient performed recumbent bike aerobic exercise to increase blood flow to the area being treated, prepare the muscles for strength training and stretching, improve overall tolerance to activity, and aerobic endurance. PT also initiated strengthening exercises this session with coordination of TAC and breathing to assist with improving proximal hip strength and offloading PFM. PT rec reviewing bowel mechanics this session however pt feels this  "will overwhelm her therefore it will be moved to NV. PT is in agreement with this POC.   Tolerated treatment well. Patient would benefit from continued PT      Plan: Continue per plan of care.      Precautions: hypertension   Impairments/functional limitations: urinary urgency, urinary frequency, nocturia   Daily Treatment Diary    Manuals 7/11 7/15 7/31 8/2 8/7     External assess Prn in future visit  nv performed      Internal assess Prn in future visit  nv performed      Internal manual therapy    20 mins, layer 1       Objective assessment Complete in next 1-2 visits Next visit                   Neuro Re-Ed          TAC     Review, with breathing, 5\"x10     PF  Review, QF        TAC+PF          TAC with march          TAC with alt knee fall out     5\"x5 ea     TAC with ball squeeze     5\"x5       TAC with SLR          TAC with s/l ABD          TAC with bridge                    Pain neuroscience education   Review        360 deg breathing  Review, issued  Review, with stretches Review, with self stretching With stretches     Ther Ex          Bike     5 mins L1                          Seated happy baby    5x5 HEP 5\"X5      Seated hs stretch   30\"x2 ea HEP 30\"x2 ea     Seated piri stretch   30\"x2 ea HEP 30\"x2 ea     Supine butterfly stretch   1 min HEP      Supine hip IR/ER AROM   5\"x5 ea HEP      Self internal STM     Review, issued, layer 1  Review                         Ther Activity          Heathy bladder habits     Inc water intake in evening by 8-12 oz stopping 2 hrs before bed                Bladder Diary  issued reviewed        Urge delay:QF nv review review       Urge delay: HR nv review review       Freeze, breathe, squeeze  nv review Review   Review, in evening      The knack           Relaxation based strategies    Deep breathing        Bowel mechanics/posture    nv nv     Gait Training                              Modalities                                              "

## 2024-08-09 ENCOUNTER — OFFICE VISIT (OUTPATIENT)
Dept: PHYSICAL THERAPY | Facility: REHABILITATION | Age: 79
End: 2024-08-09
Payer: COMMERCIAL

## 2024-08-09 DIAGNOSIS — M62.81 MUSCLE WEAKNESS: ICD-10-CM

## 2024-08-09 DIAGNOSIS — R35.0 URINARY FREQUENCY: ICD-10-CM

## 2024-08-09 DIAGNOSIS — R39.15 URINARY URGENCY: Primary | ICD-10-CM

## 2024-08-09 PROCEDURE — 97112 NEUROMUSCULAR REEDUCATION: CPT | Performed by: PHYSICAL THERAPIST

## 2024-08-09 PROCEDURE — 97530 THERAPEUTIC ACTIVITIES: CPT | Performed by: PHYSICAL THERAPIST

## 2024-08-09 NOTE — PROGRESS NOTES
Daily Note     Today's date: 2024  Patient name: Shayla Philip  : 1945  MRN: [id number]  Referring provider: Nelsy Mckoy, PT  Dx:   Encounter Diagnosis     ICD-10-CM    1. Urinary urgency  R39.15       2. Urinary frequency  R35.0       3. Muscle weakness  M62.81           Start Time: 1305  Stop Time: 1358  Total time in clinic (min): 53 minutes    Subjective: Yesterday and the day before she fel like she hurt almost all day. She did do her exercises both days. She was sitting at a show and had pain and she had a BM, a good one, but she still had pain. Then last night she did not have any pain and today she has not had any pain yet. She got up to void 6 times last night. She did not have any pain while doing her exercises. She reports she is 95% convinced that her BM is what causes her pain.       Objective: See treatment diary below      Assessment: PT initiated proper bowel mechanics and posture with simulated squat position, coordination of diaphragmatic breathing for PFM lengthening and exhaling during bearing down in place of straining for a BM as well as daily toilet sits to encourage daily BM's. Pt verbalized understanding of this and issued handout. Patient performed recumbent bike aerobic exercise to increase blood flow to the area being treated, prepare the muscles for strength training and stretching, improve overall tolerance to activity, and aerobic endurance.Pt progressed POC with addition of march and bridge for hip flex and ext strengthening with focus on coordination of TAC and breathing.   Tolerated treatment well. Patient would benefit from continued PT      Plan: Continue per plan of care.     Access Code: [email or web address]  URL: [email or web address]  Date: 2024  Prepared by: Nelsy Mckoy    Program Notes  Perform deep breathing with all stretches    Exercises  - Seated Hamstring Stretch  - 2 x daily - 2 reps - 30 secs hold  - Seated Piriformis Stretch  - 2 x daily - 2  "reps - 30 secs hold  - Seated Happy Baby With Trunk Flexion For Pelvic Relaxation  - 2 x daily - 5-10 reps - 5 secs hold  - Supine Butterfly Groin Stretch  - 2 x daily - 1 reps - 30-60 secs hold  - Supine Hip Internal and External Rotation  - 2 x daily - 10 reps - 5-10 secs hold  - Supine Hip Adduction Isometric with Ball  - 1 x daily - 10 reps - 5 secs hold  - Bent Knee Fallouts with Alternating Legs  - 1 x daily - 10 reps  - Supine Bridge  - 1 x daily - 10 reps - 2-3 secs hold  - Supine March  - 1 x daily - 10 reps     Precautions: hypertension   Impairments/functional limitations: urinary urgency, urinary frequency, nocturia   Daily Treatment Diary    Manuals 7/11 7/15 7/31 8/2 8/7 8/9    External assess Prn in future visit  nv performed      Internal assess Prn in future visit  nv performed      Internal manual therapy    20 mins, layer 1       Objective assessment Complete in next 1-2 visits Next visit                   Neuro Re-Ed          TAC     Review, with breathing, 5\"x10     PF  Review, QF        TAC+PF          TAC with march      X10 ea    TAC with alt knee fall out     5\"x5 ea 5\"x10 ea    TAC with ball squeeze     5\"x5 5\"x10       TAC with SLR      nv    TAC with s/l ABD      nv    TAC with bridge      3\"x10    TAC with clamshells       nv    Pain neuroscience education   Review        360 deg breathing  Review, issued  Review, with stretches Review, with self stretching With stretches     Ther Ex          Bike     5 mins L1  6 mins L1                         Seated happy baby    5x5 HEP 5\"X5      Seated hs stretch   30\"x2 ea HEP 30\"x2 ea     Seated piri stretch   30\"x2 ea HEP 30\"x2 ea     Supine butterfly stretch   1 min HEP      Supine hip IR/ER AROM   5\"x5 ea HEP      Self internal STM     Review, issued, layer 1  Review                         Ther Activity          Heathy bladder habits     Inc water intake in evening by 8-12 oz stopping 2 hrs before bed                Bladder Diary  issued " reviewed        Urge delay:QF nv review review       Urge delay: HR nv review review       Freeze, breathe, squeeze  nv review Review   Review, in evening      The knjennifer           Relaxation based strategies    Deep breathing        Bowel mechanics/posture    nv nv Reviewed, issued, simulated squat, breathing, bearing down, daily toilet sits.     Gait Training                              Modalities

## 2024-08-12 ENCOUNTER — OFFICE VISIT (OUTPATIENT)
Dept: PHYSICAL THERAPY | Facility: REHABILITATION | Age: 79
End: 2024-08-12
Payer: COMMERCIAL

## 2024-08-12 DIAGNOSIS — R35.0 URINARY FREQUENCY: ICD-10-CM

## 2024-08-12 DIAGNOSIS — M62.81 MUSCLE WEAKNESS: ICD-10-CM

## 2024-08-12 DIAGNOSIS — R39.15 URINARY URGENCY: Primary | ICD-10-CM

## 2024-08-12 PROCEDURE — 97110 THERAPEUTIC EXERCISES: CPT | Performed by: PHYSICAL THERAPIST

## 2024-08-12 PROCEDURE — 97112 NEUROMUSCULAR REEDUCATION: CPT | Performed by: PHYSICAL THERAPIST

## 2024-08-12 PROCEDURE — 97140 MANUAL THERAPY 1/> REGIONS: CPT | Performed by: PHYSICAL THERAPIST

## 2024-08-12 NOTE — PROGRESS NOTES
Daily Note     Today's date: 2024  Patient name: Shayla Philip  : 1945  MRN: [id number]  Referring provider: Nelsy Mckoy, PT  Dx:   Encounter Diagnosis     ICD-10-CM    1. Urinary urgency  R39.15       2. Urinary frequency  R35.0       3. Muscle weakness  M62.81           Start Time: 1220  Stop Time: 1316  Total time in clinic (min): 56 minutes    Subjective: Pt reports last night she had vaginal pain. She used a heating pad which helped and it went away by 12:30 pm. Her symptoms resolved the rest of the night and she was able to have a BM without pain. She reports the BM retraining has helped. She reports not having something to elevate her feet yet so she is going up on her toes.  She reports having pain yesterday after walking during shopping.       Objective: See treatment diary below. Pt stood up to walk to the bathroom mid-session. On the way back pt reports she was dizzy after standing for 5 secs, then it resolved. Pt reports she occasionally gets dizzy when she stands from supine to sit transfer. Pt to discuss PCP. Pt to inform PT if it occurs again when it happens during a session.       Assessment: PT cued pt to keep her feet flat on her surface when having a BM to keep her muscles relaxed to decrease straining. PT cued pt on use of 360 deg breathing when in pain to manage symptoms as well as LE and PFM stretches. Pt continues to be challenged  with 360 deg breathing compensating with abdominal tightening and chest elevation, improved with vc/tc at ribcage and abdominal region for correct technique. PT progressed POC with inc proximal hip strengthening in supine with focus on coordination fo TAC. PT initiated R hip IR PROM and post hip mobs to improve hip IR mobility. Tolerated treatment well. Patient would benefit from continued PT      Plan: Continue per plan of care.     Access Code: [email or web address]  URL: [email or web address]  Date: 2024  Prepared by: Nelsy  "Horowski    Program Notes  Perform deep breathing with all stretches    Exercises  - Seated Hamstring Stretch  - 2 x daily - 2 reps - 30 secs hold  - Seated Piriformis Stretch  - 2 x daily - 2 reps - 30 secs hold  - Seated Happy Baby With Trunk Flexion For Pelvic Relaxation  - 2 x daily - 5-10 reps - 5 secs hold  - Supine Butterfly Groin Stretch  - 2 x daily - 1 reps - 30-60 secs hold  - Supine Hip Internal and External Rotation  - 2 x daily - 10 reps - 5-10 secs hold  - Supine Bridge  - 1 x daily - 3 x weekly - 20 reps - 2-3 secs hold  - Sidelying Hip Abduction  - 1 x daily - 3 x weekly - 20 reps  - Clamshell  - 1 x daily - 3 x weekly - 20 reps - 5-10 secs hold  - Small Range Straight Leg Raise  - 1 x daily - 3 x weekly - 20 reps     Precautions: hypertension   Impairments/functional limitations: urinary urgency, urinary frequency, nocturia   Daily Treatment Diary    Manuals 7/11 7/15 7/31 8/2 8/7 8/9 8/12   External assess Prn in future visit  nv performed      Internal assess Prn in future visit  nv performed      Internal manual therapy    20 mins, layer 1       Objective assessment Complete in next 1-2 visits Next visit         Hip IR PROM, post hip mobs        5 mins R   Neuro Re-Ed          TAC     Review, with breathing, 5\"x10  With exercises   PF  Review, QF        TAC+PF          TAC with march X10 ea D/c   TAC with alt knee fall out     5\"x5 ea 5\"x10 ea D/c   TAC with ball squeeze     5\"x5 5\"x10    D/c   TAC with SLR      nv X10 ea   TAC with s/l ABD      nv X10 ea   TAC with bridge      3\"x10 3\"x20   TAC with clamshells       nv 5\"x10 R, 5\"x7 L    Pain neuroscience education   Review        360 deg breathing  Review, issued  Review, with stretches Review, with self stretching With stretches  Review, with stretches, when noticing pain   Ther Ex          Bike     5 mins L1  6 mins L1  7 mins L1                        Seated happy baby    5x5 HEP 5\"X5   5'x3   Seated hs stretch   30\"x2 ea HEP 30\"x2 ea  " "30\"x2 ea   Seated piri stretch   30\"x2 ea HEP 30\"x2 ea  30\"x2 ea   Supine butterfly stretch   1 min HEP   1 min   Supine hip IR/ER AROM   5\"x5 ea HEP   5'x5 ea   Self internal STM     Review, issued, layer 1  Review                         Ther Activity          Heathy bladder habits     Inc water intake in evening by 8-12 oz stopping 2 hrs before bed                Bladder Diary  issued reviewed        Urge delay:QF nv review review       Urge delay: HR nv review review       Freeze, breathe, squeeze  nv review Review   Review, in evening      The chip           Relaxation based strategies    Deep breathing        Bowel mechanics/posture    nv nv Reviewed, issued, simulated squat, breathing, bearing down, daily toilet sits.  Review, foot elevation with object versus heel raise    Gait Training                              Modalities                                                  "

## 2024-08-14 ENCOUNTER — APPOINTMENT (OUTPATIENT)
Dept: PHYSICAL THERAPY | Facility: REHABILITATION | Age: 79
End: 2024-08-14
Payer: COMMERCIAL

## 2024-08-20 ENCOUNTER — OFFICE VISIT (OUTPATIENT)
Dept: PHYSICAL THERAPY | Facility: REHABILITATION | Age: 79
End: 2024-08-20
Payer: COMMERCIAL

## 2024-08-20 DIAGNOSIS — R35.0 URINARY FREQUENCY: ICD-10-CM

## 2024-08-20 DIAGNOSIS — M62.81 MUSCLE WEAKNESS: ICD-10-CM

## 2024-08-20 DIAGNOSIS — R39.15 URINARY URGENCY: Primary | ICD-10-CM

## 2024-08-20 PROCEDURE — 97110 THERAPEUTIC EXERCISES: CPT | Performed by: PHYSICAL THERAPIST

## 2024-08-20 PROCEDURE — 97112 NEUROMUSCULAR REEDUCATION: CPT | Performed by: PHYSICAL THERAPIST

## 2024-08-20 NOTE — PROGRESS NOTES
"Daily Note     Today's date: 2024  Patient name: Shayla Philip  : 1945  MRN: [id number]  Referring provider: Nelsy Mckoy, PT  Dx:   Encounter Diagnosis     ICD-10-CM    1. Urinary urgency  R39.15       2. Urinary frequency  R35.0       3. Muscle weakness  M62.81           Start Time: 1233  Stop Time: 1332  Total time in clinic (min): 59 minutes    Subjective: Pt reports on  she had pain during the day and night. On  she had mild pain but went out to eat and was better. The next 2 days were painfree but one night she was up to void 10 times. She reports the night she was up 10 times she had wine that evening which may be the culprit. She averages 5 times a night voiding and she can deal with that. She has also limited strawberries because they bother her symptoms. Pt reports no issue with diabetes as far as she knows. She tried to use her stretches to manage her pain but it \"hurt so bad\" she could not do them. She has trouble with her big deep belly breathing. She is using a stool for bowel mechanics and trying to breathe but she is not sure if it is decreasing her pain. Pt feels her pain is less frequent and is less intense and she has strategies to control it with resting more frequently.       Objective: See treatment diary below. Utilized SEMg biofeedback with pt's consent with electrodes x2 placed lateral to EAS and one on R upper buttocks. Removed at end of session. Pt declined second person in the room.         Assessment: Pt is progressing slowly with decreased pain frequency with pain present and improved urinary control during the day and intermittent improvement in nighttime voiding frequency. PT cued pt on utilizing deep breathing and stretches to manage inc pain at night that she feels intermittently in her bladder however pt has difficulty doing so due to inc in pain. PT initiated sEMG biofeedback for NMRE to improve control of PFM activity with contraction and relaxation to " "assist with managing pain. Pt presents with resting PFM activity between 10-15 microvolts. PT utilized visual, verbal and tactile cueing for PFM relaxation as well as use of DB and stretching with pt able to effectively reduce PFM activity to 7-10 microvolt intermittently. Pt to focus on PFM checkins during her day for PFM relaxation. Tolerated treatment well. Patient would benefit from continued PT      Plan: Continue per plan of care.  Progress note during next visit.      Precautions: hypertension   Impairments/functional limitations: urinary urgency, urinary frequency, nocturia   Daily Treatment Diary    Manuals 8/20 8/9 8/12   External assess          Internal assess          Internal manual therapy          Objective assessment          Hip IR PROM, post hip mobs        5 mins R   Neuro Re-Ed          TAC       With exercises   PF          TAC+PF          TAC with SLR      nv X10 ea   TAC with s/l ABD      nv X10 ea   TAC with bridge      3\"x10 3\"x20   TAC with clamshells       nv 5\"x10 R, 5\"x7 L    sEMG biofeedback 35 mins Performed with DB, stretches, sitting, standing, supine          PFM checkins Review, issued         Pain neuroscience education          360 deg breathing Review, throughout session       Review, with stretches, when noticing pain   Ther Ex          Bike 7 mins L1      6 mins L1  7 mins L1                        Seated happy baby  HEP      5'x3   Seated hs stretch HEP      30\"x2 ea   Seated piri stretch HEP      30\"x2 ea   Supine butterfly stretch 1 min      1 min   Supine hip IR/ER AROM 3'x10      5'x5 ea   Self internal STM  Review, daily                             Ther Activity          Heathy bladder habits                    Bladder Diary           Urge delay:QF          Urge delay: HR          Freeze, breathe, squeeze           The knack           Relaxation based strategies           Bowel mechanics/posture      Reviewed, issued, simulated squat, breathing, bearing down, daily " toilet sits.  Review, foot elevation with object versus heel raise    Gait Training                              Modalities

## 2024-08-22 ENCOUNTER — EVALUATION (OUTPATIENT)
Dept: PHYSICAL THERAPY | Facility: REHABILITATION | Age: 79
End: 2024-08-22
Payer: COMMERCIAL

## 2024-08-22 DIAGNOSIS — M62.81 MUSCLE WEAKNESS: ICD-10-CM

## 2024-08-22 DIAGNOSIS — R39.15 URINARY URGENCY: Primary | ICD-10-CM

## 2024-08-22 DIAGNOSIS — R35.0 URINARY FREQUENCY: ICD-10-CM

## 2024-08-22 PROCEDURE — 97110 THERAPEUTIC EXERCISES: CPT | Performed by: PHYSICAL THERAPIST

## 2024-08-22 PROCEDURE — 97140 MANUAL THERAPY 1/> REGIONS: CPT | Performed by: PHYSICAL THERAPIST

## 2024-08-22 NOTE — PROGRESS NOTES
PT Re-Evaluation     Today's date: 2024  Patient name: Shayla Philip  : 1945  MRN: [id number]  Referring provider: Nelsy Mckoy, PT  Dx:   Encounter Diagnosis     ICD-10-CM    1. Urinary urgency  R39.15       2. Urinary frequency  R35.0       3. Muscle weakness  M62.81           Start Time: 1200  Stop Time: 1259  Total time in clinic (min): 59 minutes    Assessment  Impairments: impaired balance, impaired physical strength, lacks appropriate home exercise program, pain with function and emotional regulation  Symptom irritability: moderate    Assessment details: Patient is a 79 y.o. female presenting to initial examination with chief complaint of urinary urgency and frequency. Pt presents with improved urinary urgency and frequency during the day, improved bowel movements decreased pain intensity and frequency and overall improved function. Pt still remains with nocturia, pelvic pain, pain with walking >20 mins and proximal hip weakness. Pt will benefit form continued skilled PT to address above impairments and meet all LTG;S.     Understanding of Dx/Px/POC: good     Prognosis: good    Goals  Short term goals:  Pt will be able to delay urge at least 5 minutes in 6 weeks.- MET  Pt will demonstrate good understanding of urge delay techniques in 6 weeks - mET  Pt will extend intervoid period to 2-4 hours in 6 weeks.- MET    Long term goals:  Pt will be compliant with HEP by discharge. - PROGRESSING   Pt will be able to delay urge for greater than 15 minutes to allow pt to walk in her neighborhood x20 mins for CV endurance and exercise purposes -PARTIALLY MET, LIMITED BY PAIN  Pt will present with nocturia 3 times a night or less - NOT MET  Pt will present with MMT 4/5 b/l LE all planes - NOT MET      Plan  Patient would benefit from: skilled physical therapy  Planned modality interventions: biofeedback, cryotherapy and thermotherapy: hydrocollator packs    Planned therapy interventions: joint  mobilization, manual therapy, neuromuscular re-education, strengthening, stretching, therapeutic activities, therapeutic exercise, flexibility, functional ROM exercises, abdominal trunk stabilization and balance    Frequency: 1-2x week  Duration in weeks: 12  Treatment plan discussed with: patient  Plan details: Cont per POC. Focus on strengthening. Pt to walk 5-10 mins daily without inc in pain.       PT Pelvic Floor Subjective:   History of Present Illness:   See medication list scanned in media.   In the past 2 days, pt only had 1 episode of pain. She went out to eat and had spaghetti and that's when she got the pain and it was bad, from 6-9 o'clock but then it improved. Pain rated 7/10  Pt is voiding 5-6 times a night.  Pt reports 60% improvement  She feels she is peeing more normal during the day, not as frequent, she no longer has urinary urgent, and although she voids 5 times a night she no longer has pain with voiding.   She feels she has more room to improve to decrease her nighttime voiding and to alleviate her pain. Pt has had 7-14 BM's in the past week and pain only 2 of those times were painful.   Pt is consistent with internal stretches.   She is seeing urogyn tomorrow. She wants them to assess her bladder stimulator.       Social Support:     Lives in:  Multiple-level home    Lives with:  Spouse    Relationship status: /committed    Work status: retired    Life stress severity: moderate    History of Depression: yes    takes an anxiety pill  Diet and Exercise:    Diet:balanced nutrition    Pt tried returning to walking and had bladder/vaginal pain. She reports the pain came on at 20 mins. PT rec pt start with walking 5-10 mins, daily.    OB/ gyn History    Gestational History:     Prior Pregnancy: Yes      Number of prior pregnancies: 3    Number of term pregnancies: 3    Delivery Type: vaginal delivery      Menstrual History:      Menopausal: menopause  Bladder Function:     Voiding  "Difficulties negative for: urgency, frequent urination, hesitancy, straining and incomplete emptying       Voiding Difficulties comments:     Voiding frequency: every 1-2 hours and every 3-4 hours (every 2-3 hours)    Urinary leakage: no urine leakage    Urinary leakage not aggravated by: post-void dribble    Nocturia (episodes per night): 4 or more (5-6 times a night)    Painful urination: No      Intake (ounces): Water: 16, Tea: 8,     Intake (ounces) comment: PT rec clear water bottle to encourage and remind regular sipping of water during the day   Bowel Function:     Bowel frequency: daily and multiple times a day    [place] Stool Scale: type 4 and type 6    Stool softener use: no stool softeners    Enema use: no enema    Uses \"squatty potty\": no Squatty Potty  Sexual Function:     Sexually Active:  Not sexually active  Diagnostic Tests: Pt reports diagnostic testing has been completed 2 years ago, unsure of what:  Treatments:     Current treatment: physical therapy    Patient Goals:     Other patient goals:  Get back to life and not pee all the time or have pain - PROGRESSING      Objective    Pt provided verbal consent prior to and throughout objective assessment     Posture:     Tenderness:  Piriformis: neg  Obturator Internus: neg  Post pelvic floor: neg  Adductors: neg    Lumbar AROM:  WFL, pulling noted with L and R sidebending      SLS  L: poor load transfer, ipsilateral lean, unable to maintain x1 sec  R: fair load transfer, ipsilateral lean    Other Comments:   Good TAC  Fair PFM, slow to relax      MMT:   Left Right   Hip flex 4- 4   Hip ABD 3+ 4   Hip ext 3+ 3+   Hip IR 4- 4+   Hip ER 4- 4+   Knee flex 4+ 4+   Knee ext 4+ 4+   Ankle DF 4+ 4+               Special Tests:     Left Right   JANELLE  -  -   FADIR  -  -   Post Thigh Thrust  -  -     Pop angle  48 deg   48 deg    Hip IR PROM 42 deg 30 deg                   Precautions: hypertension   Impairments/functional limitations: urinary urgency, " "urinary frequency, nocturia   Daily Treatment Diary    Manuals 8/20 8/22 8/9 8/12   External assess          Internal assess          Internal manual therapy          Objective assessment          Hip IR PROM, post hip mobs   nv     5 mins R   Re-eval          Neuro Re-Ed          TAC       With exercises   PF          TAC+PF          TAC with SLR  HEP    nv X10 ea   TAC with s/l ABD  Review, x5 ea    nv X10 ea   TAC with bridge  HEP    3\"x10 3\"x20   TAC with clamshells   Review, 5\"x3 ea    nv 5\"x10 R, 5\"x7 L    sEMG biofeedback 35 mins Performed with DB, stretches, sitting, standing, supine  np        PFM checkins Review, issued         360 deg breathing Review, throughout session       Review, with stretches, when noticing pain   Ther Ex          Bike 7 mins L1  8 mins L1    6 mins L1  7 mins L1    tm                    Seated happy baby  HEP      5'x3   Seated hs stretch HEP      30\"x2 ea   Seated piri stretch HEP      30\"x2 ea   Supine butterfly stretch 1 min      1 min   Supine hip IR/ER AROM 3'x10      5'x5 ea   Self internal STM  Review, daily                             Ther Activity          Heathy bladder habits  Water intake, inc by 8 oz, using a water bottle                   Urge delay: HR          Freeze, breathe, squeeze           Bowel mechanics/posture      Reviewed, issued, simulated squat, breathing, bearing down, daily toilet sits.  Review, foot elevation with object versus heel raise    Gait Training                              Modalities                                             "

## 2024-08-22 NOTE — LETTER
2024    Apoorva VINEET Costa  [address]  [place] [address]    Patient: Shayla Philip   YOB: 1945   Date of Visit: 2024     Encounter Diagnosis     ICD-10-CM    1. Urinary urgency  R39.15       2. Urinary frequency  R35.0       3. Muscle weakness  M62.81           Dear Dr. Costa:    Thank you for your recent referral of Shayla Philip. Please review the attached evaluation summary from Shayla's recent visit.     Please verify that you agree with the plan of care by signing the attached order.     If you have any questions or concerns, please do not hesitate to call.     I sincerely appreciate the opportunity to share in the care of one of your patients and hope to have another opportunity to work with you in the near future.       Sincerely,    Nelsy Mckoy, PT      Referring Provider:      I certify that I have read the below Plan of Care and certify the need for these services furnished under this plan of treatment while under my care.                    VINEET Franco  [address]  [place] [address]  Via Fax: [phone]          PT Re-Evaluation     Today's date: 2024  Patient name: Shayla Philip  : 1945  MRN: [id number]  Referring provider: Nelsy Mckoy, PT  Dx:   Encounter Diagnosis     ICD-10-CM    1. Urinary urgency  R39.15       2. Urinary frequency  R35.0       3. Muscle weakness  M62.81           Start Time: 1200  Stop Time: 1259  Total time in clinic (min): 59 minutes    Assessment  Impairments: impaired balance, impaired physical strength, lacks appropriate home exercise program, pain with function and emotional regulation  Symptom irritability: moderate    Assessment details: Patient is a 79 y.o. female presenting to initial examination with chief complaint of urinary urgency and frequency. Pt presents with improved urinary urgency and frequency during the day, improved bowel movements  decreased pain intensity and frequency and overall improved function. Pt still remains with nocturia, pelvic pain, pain with walking >20 mins and proximal hip weakness. Pt will benefit form continued skilled PT to address above impairments and meet all LTG;S.     Understanding of Dx/Px/POC: good     Prognosis: good    Goals  Short term goals:  Pt will be able to delay urge at least 5 minutes in 6 weeks.- MET  Pt will demonstrate good understanding of urge delay techniques in 6 weeks - mET  Pt will extend intervoid period to 2-4 hours in 6 weeks.- MET    Long term goals:  Pt will be compliant with HEP by discharge. - PROGRESSING   Pt will be able to delay urge for greater than 15 minutes to allow pt to walk in her neighborhood x20 mins for CV endurance and exercise purposes -PARTIALLY MET, LIMITED BY PAIN  Pt will present with nocturia 3 times a night or less - NOT MET  Pt will present with MMT 4/5 b/l LE all planes - NOT MET      Plan  Patient would benefit from: skilled physical therapy  Planned modality interventions: biofeedback, cryotherapy and thermotherapy: hydrocollator packs    Planned therapy interventions: joint mobilization, manual therapy, neuromuscular re-education, strengthening, stretching, therapeutic activities, therapeutic exercise, flexibility, functional ROM exercises, abdominal trunk stabilization and balance    Frequency: 1-2x week  Duration in weeks: 12  Treatment plan discussed with: patient  Plan details: Cont per POC. Focus on strengthening. Pt to walk 5-10 mins daily without inc in pain.       PT Pelvic Floor Subjective:   History of Present Illness:   See medication list scanned in media.   In the past 2 days, pt only had 1 episode of pain. She went out to eat and had spaghetti and that's when she got the pain and it was bad, from 6-9 o'clock but then it improved. Pain rated 7/10  Pt is voiding 5-6 times a night.  Pt reports 60% improvement  She feels she is peeing more normal during the  "day, not as frequent, she no longer has urinary urgent, and although she voids 5 times a night she no longer has pain with voiding.   She feels she has more room to improve to decrease her nighttime voiding and to alleviate her pain. Pt has had 7-14 BM's in the past week and pain only 2 of those times were painful.   Pt is consistent with internal stretches.   She is seeing urogyn tomorrow. She wants them to assess her bladder stimulator.       Social Support:     Lives in:  Multiple-level home    Lives with:  Spouse    Relationship status: /committed    Work status: retired    Life stress severity: moderate    History of Depression: yes    takes an anxiety pill  Diet and Exercise:    Diet:balanced nutrition    Pt tried returning to walking and had bladder/vaginal pain. She reports the pain came on at 20 mins. PT rec pt start with walking 5-10 mins, daily.    OB/ gyn History    Gestational History:     Prior Pregnancy: Yes      Number of prior pregnancies: 3    Number of term pregnancies: 3    Delivery Type: vaginal delivery      Menstrual History:      Menopausal: menopause  Bladder Function:     Voiding Difficulties negative for: urgency, frequent urination, hesitancy, straining and incomplete emptying       Voiding Difficulties comments:     Voiding frequency: every 1-2 hours and every 3-4 hours (every 2-3 hours)    Urinary leakage: no urine leakage    Urinary leakage not aggravated by: post-void dribble    Nocturia (episodes per night): 4 or more (5-6 times a night)    Painful urination: No      Intake (ounces): Water: 16, Tea: 8,     Intake (ounces) comment: PT rec clear water bottle to encourage and remind regular sipping of water during the day   Bowel Function:     Bowel frequency: daily and multiple times a day    [place] Stool Scale: type 4 and type 6    Stool softener use: no stool softeners    Enema use: no enema    Uses \"squatty potty\": no Squatty Potty  Sexual Function:     Sexually Active:  Not " "sexually active  Diagnostic Tests: Pt reports diagnostic testing has been completed 2 years ago, unsure of what:  Treatments:     Current treatment: physical therapy    Patient Goals:     Other patient goals:  Get back to life and not pee all the time or have pain - PROGRESSING      Objective    Pt provided verbal consent prior to and throughout objective assessment     Posture:     Tenderness:  Piriformis: neg  Obturator Internus: neg  Post pelvic floor: neg  Adductors: neg    Lumbar AROM:  WFL, pulling noted with L and R sidebending      SLS  L: poor load transfer, ipsilateral lean, unable to maintain x1 sec  R: fair load transfer, ipsilateral lean    Other Comments:   Good TAC  Fair PFM, slow to relax      MMT:   Left Right   Hip flex 4- 4   Hip ABD 3+ 4   Hip ext 3+ 3+   Hip IR 4- 4+   Hip ER 4- 4+   Knee flex 4+ 4+   Knee ext 4+ 4+   Ankle DF 4+ 4+               Special Tests:     Left Right   JANELLE  -  -   FADIR  -  -   Post Thigh Thrust  -  -     Pop angle  48 deg   48 deg    Hip IR PROM 42 deg 30 deg                   Precautions: hypertension   Impairments/functional limitations: urinary urgency, urinary frequency, nocturia   Daily Treatment Diary    Manuals 8/20 8/22 8/9 8/12   External assess          Internal assess          Internal manual therapy          Objective assessment          Hip IR PROM, post hip mobs   nv     5 mins R   Re-eval          Neuro Re-Ed          TAC       With exercises   PF          TAC+PF          TAC with SLR  HEP    nv X10 ea   TAC with s/l ABD  Review, x5 ea    nv X10 ea   TAC with bridge  HEP    3\"x10 3\"x20   TAC with clamshells   Review, 5\"x3 ea    nv 5\"x10 R, 5\"x7 L    sEMG biofeedback 35 mins Performed with DB, stretches, sitting, standing, supine  np        PFM checkins Review, issued         360 deg breathing Review, throughout session       Review, with stretches, when noticing pain   Ther Ex          Bike 7 mins L1  8 mins L1    6 mins L1  7 mins L1    tm        " "            Seated happy baby  HEP      5'x3   Seated hs stretch HEP      30\"x2 ea   Seated piri stretch HEP      30\"x2 ea   Supine butterfly stretch 1 min      1 min   Supine hip IR/ER AROM 3'x10      5'x5 ea   Self internal STM  Review, daily                             Ther Activity          Heathy bladder habits  Water intake, inc by 8 oz, using a water bottle                   Urge delay: HR          Freeze, breathe, squeeze           Bowel mechanics/posture      Reviewed, issued, simulated squat, breathing, bearing down, daily toilet sits.  Review, foot elevation with object versus heel raise    Gait Training                              Modalities                                                             "

## 2024-08-26 ENCOUNTER — OFFICE VISIT (OUTPATIENT)
Dept: PHYSICAL THERAPY | Facility: REHABILITATION | Age: 79
End: 2024-08-26
Payer: COMMERCIAL

## 2024-08-26 DIAGNOSIS — R35.0 URINARY FREQUENCY: ICD-10-CM

## 2024-08-26 DIAGNOSIS — M62.81 MUSCLE WEAKNESS: ICD-10-CM

## 2024-08-26 DIAGNOSIS — R39.15 URINARY URGENCY: Primary | ICD-10-CM

## 2024-08-26 PROCEDURE — 97112 NEUROMUSCULAR REEDUCATION: CPT | Performed by: PHYSICAL THERAPIST

## 2024-08-26 PROCEDURE — 97110 THERAPEUTIC EXERCISES: CPT | Performed by: PHYSICAL THERAPIST

## 2024-08-26 NOTE — PROGRESS NOTES
"Daily Note     Today's date: 2024  Patient name: Shayla Philip  : 1945  MRN: [id number]  Referring provider: Nelsy Mckoy, PT  Dx:   Encounter Diagnosis     ICD-10-CM    1. Urinary urgency  R39.15       2. Urinary frequency  R35.0       3. Muscle weakness  M62.81           Start Time: 1033  Stop Time: 1128  Total time in clinic (min): 55 minutes    Subjective: Pt repots today is\"so far so good.\" Pt documented her painful days. Pt reports she had no pain on . On  she was painfree all day until the evening from 8-10pm. The next day she had no pain all day until the evening again for a few hours. On  she had a BM in the morning and she had some pain off an on but then painfree all night. On , she was painfree. She had ice cream before bed and no pain and only went to the bathroom only 4 times that night. On , yesterday, was terrible and she had pain frequently and was peeing every 20-25 mins. She did drink 32 oz of water that day and the day before. Pt is not sure if the ice cream bothered her or not. She is going to try to go without ice cream for a few days and track symptoms and then add back in. She had urogyn vist this last week and had an internal assessment without pain. Pt reports she forgot about walking daily for 5 mins and would like PT to write that down.       Objective: See treatment diary below      Assessment: Pt is progressing well with overall decreased pain frequency and duration. Pts pain is primarily worsened in the evening hours may be related to overall fatigue. PT session focused on hip strengthening in standing to assist with offloading PFM. Pt most challenged with L>R LE due to weakness requiring verbal, visual and tactile cueing as well as UE support to maintain upright posture and vid compensation with ipsialteral leaning. Pt also challenged with SLS L>R requiring inc UE support. Pt will benefit form continued skilled PT to continue to improve strength to " offload PFM and manage pain at end of day. Pt's pain may also be aggravated by dietary intake such as dairy as she noticed an increase in pain within 12 hours of eating dairy.   Tolerated treatment well. Patient would benefit from continued PT      Plan: Continue per plan of care.  Assess response to standing exercises.    Access Code: [email or web address]  URL: [email or web address]  Date: 08/26/2024  Prepared by: Nelsy Mckoy    Program Notes  Perform deep breathing with all stretches    Exercises  - Walking  - 1 x daily  - Seated Hamstring Stretch  - 1 x daily - 2 reps - 30 secs hold  - Seated Piriformis Stretch  - 1 x daily - 2 reps - 30 secs hold  - Seated Happy Baby With Trunk Flexion For Pelvic Relaxation  - 1 x daily - 5-10 reps - 5 secs hold  - Supine Butterfly Groin Stretch  - 1 x daily - 1 reps - 30-60 secs hold  - Supine Hip Internal and External Rotation  - 1 x daily - 10 reps - 5-10 secs hold  - Supine Bridge  - 3 x weekly - 20 reps - 2-3 secs hold  - Sidelying Hip Abduction  - 3 x weekly - 20 reps  - Clamshell  - 3 x weekly - 20 reps - 5-10 secs hold  - [place] Straight Leg Raise  - 3 x weekly - 20 reps  - Single Leg Stance  - 3 x weekly - 1 reps - 30 secs hold  - Standing Hip Abduction with Counter Support  - 3 x weekly - 2 sets - 10 reps  - Standing Hip Extension with Counter Support  - 3 x weekly - 2 sets - 10 reps  - Standing March with Counter Support  - 3 x weekly - 2 sets - 10 reps      Precautions: hypertension   Impairments/functional limitations: urinary urgency, urinary frequency, nocturia   Daily Treatment Diary    Manuals 8/20 8/22 8/26 8/12   External assess          Internal assess          Internal manual therapy          Objective assessment          Hip IR PROM, post hip mobs   nv 6 mins R     5 mins R   Re-eval          Neuro Re-Ed          TAC       With exercises   PF          TAC+PF          TAC with SLR  HEP HEP    X10 ea   TAC with s/l ABD  Review, x5 ea HEP    X10 ea  "  TAC with bridge  HEP HEP    3\"x20   TAC with clamshells   Review, 5\"x3 ea HEP    5\"x10 R, 5\"x7 L    SLS   30\"x3 ea, intermittent UE support        Hip hike to neutral   Review, x5 ea       sEMG biofeedback 35 mins Performed with DB, stretches, sitting, standing, supine  np        PFM checkins Review, issued         360 deg breathing Review, throughout session       Review, with stretches, when noticing pain   Ther Ex          Bike 7 mins L1  8 mins L1 9 mins L1     7 mins L1    TM                    Seated happy baby  HEP      5'x3   Seated hs stretch HEP      30\"x2 ea   Seated piri stretch HEP  45\"x2 ea    30\"x2 ea   Supine butterfly stretch 1 min      1 min   Supine hip IR/ER AROM 3'x10      5'x5 ea   Self internal STM  Review, daily         Standing hip ABD   X10 ea       Standing hip ext   X10 ea       Standing march    X10 ea                           Ther Activity          Heathy bladder habits  Water intake, inc by 8 oz, using a water bottle                   Urge delay: HR          Freeze, breathe, squeeze           Bowel mechanics/posture       Review, foot elevation with object versus heel raise    Gait Training                              Modalities                                                  "

## 2024-08-28 ENCOUNTER — OFFICE VISIT (OUTPATIENT)
Dept: PHYSICAL THERAPY | Facility: REHABILITATION | Age: 79
End: 2024-08-28
Payer: COMMERCIAL

## 2024-08-28 DIAGNOSIS — M62.81 MUSCLE WEAKNESS: ICD-10-CM

## 2024-08-28 DIAGNOSIS — R35.0 URINARY FREQUENCY: ICD-10-CM

## 2024-08-28 DIAGNOSIS — R39.15 URINARY URGENCY: Primary | ICD-10-CM

## 2024-08-28 PROCEDURE — 97112 NEUROMUSCULAR REEDUCATION: CPT | Performed by: PHYSICAL THERAPIST

## 2024-08-28 PROCEDURE — 97110 THERAPEUTIC EXERCISES: CPT | Performed by: PHYSICAL THERAPIST

## 2024-08-28 NOTE — PROGRESS NOTES
Daily Note     Today's date: 2024  Patient name: Shayla Philip  : 1945  MRN: [id number]  Referring provider: Nelsy Mckoy, PT  Dx:   Encounter Diagnosis     ICD-10-CM    1. Urinary urgency  R39.15       2. Urinary frequency  R35.0       3. Muscle weakness  M62.81           Start Time: 1233  Stop Time: 1328  Total time in clinic (min): 55 minutes    Subjective: Pt is not having pain at night anymore but after Monday's visit when she went home she had pain off and on. She noticed pain off and on yesterday as well. The pain makes it feels like she needs to pee but she tries to delay it. She reports the pain always begins after a BM. Pt has seen GI in the past and was taken metamucil but has not been taking it recently. Pt has been doing her internal stretches for PFM relaxation and is not having pain. Pain right now rated 6/10. Pt is sitting in clinic with legs crossed and arms crossed. Pain is located on L side, pt points to groin.    Objective: See treatment diary below      Assessment: PT cued pt in sitting position to un-cross arms and leg to allow for improved muscle relaxation. PT cued pt on diaphragmatic breathing and use of stretching with seated piriformis stretch and seated happy baby. During seated piriformis stretch pt experienced a reduction in pain by 2 pts. Pt demo ability to walk in clinic 90 ft with inc in L hip and groin pain. PT then had pt amb with SPC in RUE with 2 pt step through pattern and pt able to do so with less reported pain. Pt does present with LLE weakness compared to R side when tested on re-eval in previous visit. Pt's pain in L hip/groin likely related to L LE weakness and may benefit from use of SPC temporarily for ambulation to increase overall support and minimize aggravation of pain. During clinic amb pt utilized SPC to assist with managing pain this session. PT cued pt how to set SPC for correct height with 15-30 deg end in R elbow when using and pt demo  "independent ability to amb with SPC. During SLS, pt required UE support to avoid compensation with hip drop during LLE stance and increased UE support to maintain balance. Pt will cont to benefit from skilled PT to improve strength and stability and maximize function without pain. Tolerated treatment well. Patient would benefit from continued PT      Plan: Continue per plan of care.  Assess if pt used SPC as well as stretches for pain relief during her day.      Precautions: hypertension   Impairments/functional limitations: urinary urgency, urinary frequency, nocturia   Daily Treatment Diary    Manuals 8/20 8/22 8/26 8/28 8/12   External assess          Internal assess          Internal manual therapy          Objective assessment          Hip IR PROM, post hip mobs   nv 6 mins R  np   5 mins R   Re-eval          Neuro Re-Ed          TAC       With exercises   PF          TAC+PF          TAC with SLR  HEP HEP    X10 ea   TAC with s/l ABD  Review, x5 ea HEP    X10 ea   TAC with bridge  HEP HEP    3\"x20   TAC with clamshells   Review, 5\"x3 ea HEP    5\"x10 R, 5\"x7 L    SLS   30\"x3 ea, intermittent UE support  30\"x2 ea with UE support      Hip hike to neutral   Review, x5 ea Review, LLE      sEMG biofeedback 35 mins Performed with DB, stretches, sitting, standing, supine  np        PFM checkins Review, issued         360 deg breathing Review, throughout session       Review, with stretches, when noticing pain   Ther Ex          Bike 7 mins L1  8 mins L1 9 mins L1  10 mins L1    7 mins L1    TM                    Seated happy baby  HEP   5\"x5   5'x3   Seated hs stretch HEP   30\"x2 ea   30\"x2 ea   Seated piri stretch HEP  45\"x2 ea 30\"x2 ea   30\"x2 ea   Supine butterfly stretch 1 min      1 min   Supine hip IR/ER AROM 3'x10      5'x5 ea   Self internal STM  Review, daily         Standing hip ABD   X10 ea X10 ea      Standing hip ext   X10 ea X10 ea      Standing march    X10 ea X10 ea      Amb in clinic with and without " SPC    90'x4 laps                 Ther Activity          Heathy bladder habits  Water intake, inc by 8 oz, using a water bottle                   Urge delay: HR          Freeze, breathe, squeeze           Bowel mechanics/posture       Review, foot elevation with object versus heel raise    Gait Training                              Modalities

## 2024-09-06 ENCOUNTER — OFFICE VISIT (OUTPATIENT)
Dept: PHYSICAL THERAPY | Facility: REHABILITATION | Age: 79
End: 2024-09-06
Payer: COMMERCIAL

## 2024-09-06 DIAGNOSIS — R35.0 URINARY FREQUENCY: ICD-10-CM

## 2024-09-06 DIAGNOSIS — M62.81 MUSCLE WEAKNESS: ICD-10-CM

## 2024-09-06 DIAGNOSIS — R39.15 URINARY URGENCY: Primary | ICD-10-CM

## 2024-09-06 PROCEDURE — 97530 THERAPEUTIC ACTIVITIES: CPT | Performed by: PHYSICAL THERAPIST

## 2024-09-06 PROCEDURE — 97140 MANUAL THERAPY 1/> REGIONS: CPT | Performed by: PHYSICAL THERAPIST

## 2024-09-06 PROCEDURE — 97110 THERAPEUTIC EXERCISES: CPT | Performed by: PHYSICAL THERAPIST

## 2024-09-06 NOTE — PROGRESS NOTES
Daily Note     Today's date: 2024  Patient name: Shayla Philip  : 1945  MRN: [id number]  Referring provider: Nelsy Mckoy, PT  Dx:   Encounter Diagnosis     ICD-10-CM    1. Urinary urgency  R39.15       2. Urinary frequency  R35.0       3. Muscle weakness  M62.81           Start Time: 1400  Stop Time: 1453  Total time in clinic (min): 53 minutes    Subjective: Pt report brady is still off and on in her bladder. She had 3 bad days in a row. She was going out a lot over the holiday weekend. She was at a picnic and ate all day. Yesterday she had a BM, no pain, and no urinary symptoms. Today she had a BM and had pain and she does not think it was the BM because other days are fine. But last night she ate ice cream at night and she thinks that is why her pain was worse today. Pt did not try walking with a cane since LV.       Objective: See treatment diary below  R lateral flank pain with L and R lateral sidebending     Assessment: Pt continues to present with intermittent pain, may be related to dietary triggers however pt is presenting with overall decrease in pain episode intensity and frequency. Pt is progressing well with improved strength and endurance with ability to complete standing exercises with increased reps. Pt did require cueing during RLE stance to avoid compensation with trunk rotation. Pt presents with R lateral trunk pain with sidebending, initiated lateral sidebending stretch in seated to improve this ROM. Tolerated treatment well. Patient would benefit from continued PT      Plan: Continue per plan of care.  PT rec pt seek out RD to address potential dietary triggers to her symptoms but pt would like to try on her own to manage her dietary triggers. Pt will try 2 weeks without ice cream and see if her symptoms change.      Precautions: hypertension   Impairments/functional limitations: urinary urgency, urinary frequency, nocturia   Daily Treatment Diary    Manuals   "9/6     External assess          Internal assess          Internal manual therapy          Objective assessment          R QL stretch, R lateral trunk STM     8 mins     Hip IR PROM, post hip mobs   nv 6 mins R  np 6 mins R     Re-eval          Neuro Re-Ed          TAC          PF          TAC+PF          TAC with SLR  HEP HEP       TAC with s/l ABD  Review, x5 ea HEP       TAC with bridge  HEP HEP       TAC with clamshells   Review, 5\"x3 ea HEP       SLS   30\"x3 ea, intermittent UE support  30\"x2 ea with UE support 30\"x2 ea with 1 UE support      3 way tap                              Hip hike to neutral   Review, x5 ea Review, LLE      sEMG biofeedback 35 mins Performed with DB, stretches, sitting, standing, supine  np        PFM checkins Review, issued         360 deg breathing Review, throughout session          Ther Ex          Bike 7 mins L1  8 mins L1 9 mins L1  10 mins L1  9 mins L1     TM     nv               Seated happy baby  HEP   5\"x5 HEP     Seated hs stretch HEP   30\"x2 ea HEP     Seated piri stretch HEP  45\"x2 ea 30\"x2 ea 30\"x2 ea     Supine butterfly stretch 1 min         Supine hip IR/ER AROM 3'x10         Self internal STM  Review, daily         Standing hip ABD   X10 ea X10 ea X15 ea     Standing hip ext   X10 ea X10 ea X15 ea     Standing march    X10 ea X10 ea X15 ea     Amb in clinic with and without SPC    90'x4 laps                 FSU with march          Squatting          Side stepping squat          Monster Walks           Ther Activity          Heathy bladder habits  Water intake, inc by 8 oz, using a water bottle    REVIEW,  POTENTIAL BLADDER/BOWEL IRRITANTS                Urge delay: HR          Freeze, breathe, squeeze           Bowel mechanics/posture          Gait Training                              Modalities                                                      "

## 2024-09-12 ENCOUNTER — APPOINTMENT (OUTPATIENT)
Dept: PHYSICAL THERAPY | Facility: REHABILITATION | Age: 79
End: 2024-09-12
Payer: COMMERCIAL

## 2024-09-12 DIAGNOSIS — M62.81 MUSCLE WEAKNESS: ICD-10-CM

## 2024-09-12 DIAGNOSIS — R39.15 URINARY URGENCY: Primary | ICD-10-CM

## 2024-09-12 DIAGNOSIS — R35.0 URINARY FREQUENCY: ICD-10-CM

## 2024-09-12 NOTE — PROGRESS NOTES
Daily Note     Today's date: 2024  Patient name: Shayla Philip  : 1945  MRN: [id number]  Referring provider: Nelsy Mckoy, PT  Dx:   Encounter Diagnosis     ICD-10-CM    1. Urinary urgency  R39.15       2. Urinary frequency  R35.0       3. Muscle weakness  M62.81                      Subjective: Pt reports    Seeing a RD? For diet?   How is not eating ice cream, seeing a difference?    brady is still off and on in her bladder. She had 3 bad days in a row. She was going out a lot over the holiday weekend. She was at a picnic and ate all day. Yesterday she had a BM, no pain, and no urinary symptoms. Today she had a BM and had pain and she does not think it was the BM because other days are fine. But last night she ate ice cream at night and she thinks that is why her pain was worse today. Pt did not try walking with a cane since LV.       Objective: See treatment diary below  R lateral flank pain with L and R lateral sidebending     Assessment: Pt continues       to present with intermittent pain, may be related to dietary triggers however pt is presenting with overall decrease in pain episode intensity and frequency. Pt is progressing well with improved strength and endurance with ability to complete standing exercises with increased reps. Pt did require cueing during RLE stance to avoid compensation with trunk rotation. Pt presents with R lateral trunk pain with sidebending, initiated lateral sidebending stretch in seated to improve this ROM. Tolerated treatment well. Patient would benefit from continued PT      Plan: Continue per plan of care.  PT rec pt seek out RD to address potential dietary triggers to her symptoms but pt would like to try on her own to manage her dietary triggers. Pt will try 2 weeks without ice cream and see if her symptoms change.      Precautions: hypertension   Impairments/functional limitations: urinary urgency, urinary frequency, nocturia   Daily Treatment  "Diary    Manuals 8/20 8/22 8/26 8/28 9/6 9/12    External assess          Internal assess          Internal manual therapy          Objective assessment          R QL stretch, R lateral trunk STM     8 mins ***    Hip IR PROM, post hip mobs   nv 6 mins R  np 6 mins R ***    Re-eval          Neuro Re-Ed          TAC          PF          TAC+PF          TAC with SLR  HEP HEP       TAC with s/l ABD  Review, x5 ea HEP       TAC with bridge  HEP HEP       TAC with clamshells   Review, 5\"x3 ea HEP       SLS   30\"x3 ea, intermittent UE support  30\"x2 ea with UE support 30\"x2 ea with 1 UE support  ***    3 way tap                              Hip hike to neutral   Review, x5 ea Review, LLE      sEMG biofeedback 35 mins Performed with DB, stretches, sitting, standing, supine  np        PFM checkins Review, issued         360 deg breathing Review, throughout session          Ther Ex          Bike 7 mins L1  8 mins L1 9 mins L1  10 mins L1  9 mins L1     TM     nv ***              Seated happy baby  HEP   5\"x5 HEP     Seated hs stretch HEP   30\"x2 ea HEP     Seated piri stretch HEP  45\"x2 ea 30\"x2 ea 30\"x2 ea ***    Supine butterfly stretch 1 min         Supine hip IR/ER AROM 3'x10         Self internal STM  Review, daily         Standing hip ABD   X10 ea X10 ea X15 ea ***    Standing hip ext   X10 ea X10 ea X15 ea ***    Standing march    X10 ea X10 ea X15 ea ***    Amb in clinic with and without SPC    90'x4 laps                 FSU with march          Squatting          Side stepping squat          Monster Walks           Ther Activity          Heathy bladder habits  Water intake, inc by 8 oz, using a water bottle    REVIEW,  POTENTIAL BLADDER/BOWEL IRRITANTS                Urge delay: HR          Freeze, breathe, squeeze           Bowel mechanics/posture          Gait Training                              Modalities                                                      "

## 2024-09-17 ENCOUNTER — OFFICE VISIT (OUTPATIENT)
Dept: PHYSICAL THERAPY | Facility: REHABILITATION | Age: 79
End: 2024-09-17
Payer: COMMERCIAL

## 2024-09-17 DIAGNOSIS — R39.15 URINARY URGENCY: Primary | ICD-10-CM

## 2024-09-17 DIAGNOSIS — R35.0 URINARY FREQUENCY: ICD-10-CM

## 2024-09-17 DIAGNOSIS — M62.81 MUSCLE WEAKNESS: ICD-10-CM

## 2024-09-17 PROCEDURE — 97140 MANUAL THERAPY 1/> REGIONS: CPT | Performed by: PHYSICAL THERAPIST

## 2024-09-17 PROCEDURE — 97112 NEUROMUSCULAR REEDUCATION: CPT | Performed by: PHYSICAL THERAPIST

## 2024-09-17 NOTE — PROGRESS NOTES
Daily Note     Today's date: 2024  Patient name: Shayla Philip  : 1945  MRN: [id number]  Referring provider: Nelsy Mckoy, PT  Dx:   Encounter Diagnosis     ICD-10-CM    1. Urinary urgency  R39.15       2. Urinary frequency  R35.0       3. Muscle weakness  M62.81           Start Time: 1237  Stop Time: 1332  Total time in clinic (min): 55 minutes    Subjective: Pt reports she thought she was getting better and it was but now she is having pain every day and most of the time she does not know what starts it. She is doing her exercises every day but Sundays. She had a bad episode at night one night and was in pain all night. The next morning she called urogyn and was given a suppository of valium to take at night. She has been using it and does not have any pain at night now. She has pain during the day now, every day. She called her physician this morning and rec she use the suppository during the day as well. She has not tried this yet. She does not feel like she is in a good place right now. Pt cancelled her last visit because her pain was too great. She could not even walk. She finds relief with her butterfly stretch and her hip rotation stretches which help for a little while. She has not tried walking with a cane as discussed. Pt made an appointment with Dr. Leger that is in 1 month. She does her internal stretches and reports they are not bothering her anymore.         Objective: See treatment diary below    Pt provided consent prior to and throughout initiation of external/internal assessment  Pt declined second person in the room      Position: supine exam     Tenderness to palpation:  Layer 1: bulbospongiosus, ischiocavernosus: neg   Layer 2: periurethral muscles: neg  Layer 3: OI, coccygeus, iliococcygeus: neg      Pt presents with decreased muscle tension noted in pelvic floor muscles since last internal assessment.    SLS  L: fair load transfer, ipsilateral lean  R: fair load transfer,  ipsilateral lean        MMT:    Left Right   Hip flex 4 4   Hip ABD 4- 4   Hip ext 4- 4-   Hip IR 4+ 4+   Hip ER 4 4+   Knee flex 4+ 4+   Knee ext 4+ 4+   Ankle DF 4+ 4+                       Assessment: PT cue dpt on pain neuroscience education with goal of down regulating nervous system to help manage her response to pain that is present. Pt experiences positive response to making greeting cards when in pain and it assists in decreasing her pain levels. PT rec consult with mental health professional for multidisciplinary treatment approach to managing pelvic pain as pt verbalizes significant distress regarding her pain levels and questioning if she will always be in pain and if treatment will work for her pain. PT cue dpton pain neuroscience education and the brain processing role in pain experiences and ways to mitigate this. PT rec pt utilize a calming activity when in pain ot manage her pain response including quilting, making a greeting card, reading a book. PT performed internal vaginal assessment this visit with pt presenting with neg tenderness to palpation throughout PFM and improved tissue tension. Pt to cont with self internal stretches but dec frequency form daily to 3-4 times per week for the next 1-2 weeks. If symptoms do not regress, pt can continue to dec frequency by 1x/week every 1-2 weeks, assessing for regression of symptoms. Pt can trial use of SPC to manage her pain during her day. Pt still remains with L>R hip weakness however improved since last re-eval and will benefit from continued skilled PT to further support and offload PFM. Tolerated treatment well. Patient would benefit from continued PT      Plan: Continue per plan of care.      Precautions: hypertension   Impairments/functional limitations: urinary urgency, urinary frequency, nocturia   Daily Treatment Diary    Manuals 8/20 8/22 8/26 8/28 9/6 9/17    External assess          Internal assess      performed    Internal manual therapy  "         Objective assessment      performed    R QL stretch, R lateral trunk STM     8 mins     Hip IR PROM, post hip mobs   nv 6 mins R  np 6 mins R     Re-eval          Neuro Re-Ed          TAC          PF          TAC+PF          TAC with SLR  HEP HEP       TAC with s/l ABD  Review, x5 ea HEP       TAC with bridge  HEP HEP       TAC with clamshells   Review, 5\"x3 ea HEP       SLS   30\"x3 ea, intermittent UE support  30\"x2 ea with UE support 30\"x2 ea with 1 UE support      3 way tap                              Hip hike to neutral   Review, x5 ea Review, LLE      sEMG biofeedback 35 mins Performed with DB, stretches, sitting, standing, supine  np        PNS education      Review, down regulate nervous system,strategies to manage pain     PFM checkins Review, issued         360 deg breathing Review, throughout session          Ther Ex          Bike 7 mins L1  8 mins L1 9 mins L1  10 mins L1  9 mins L1     TM     nv               Seated happy baby  HEP   5\"x5 HEP     Seated hs stretch HEP   30\"x2 ea HEP     Seated piri stretch HEP  45\"x2 ea 30\"x2 ea 30\"x2 ea     Supine butterfly stretch 1 min         Supine hip IR/ER AROM 3'x10         Self internal STM  Review, daily         Standing hip ABD   X10 ea X10 ea X15 ea     Standing hip ext   X10 ea X10 ea X15 ea     Standing march    X10 ea X10 ea X15 ea     Amb in clinic with and without SPC    90'x4 laps                 FSU with march          Squatting          Side stepping squat          Monster Walks           Ther Activity          Heathy bladder habits  Water intake, inc by 8 oz, using a water bottle    REVIEW,  POTENTIAL BLADDER/BOWEL IRRITANTS                Urge delay: HR          Freeze, breathe, squeeze           Bowel mechanics/posture          Gait Training                              Modalities                                                        "

## 2024-09-24 ENCOUNTER — OFFICE VISIT (OUTPATIENT)
Dept: PHYSICAL THERAPY | Facility: REHABILITATION | Age: 79
End: 2024-09-24
Payer: COMMERCIAL

## 2024-09-24 DIAGNOSIS — R35.0 URINARY FREQUENCY: ICD-10-CM

## 2024-09-24 DIAGNOSIS — M62.81 MUSCLE WEAKNESS: ICD-10-CM

## 2024-09-24 DIAGNOSIS — R39.15 URINARY URGENCY: Primary | ICD-10-CM

## 2024-09-24 PROCEDURE — 97112 NEUROMUSCULAR REEDUCATION: CPT | Performed by: PHYSICAL THERAPIST

## 2024-09-24 PROCEDURE — 97110 THERAPEUTIC EXERCISES: CPT | Performed by: PHYSICAL THERAPIST

## 2024-09-24 NOTE — PROGRESS NOTES
"Daily Note     Today's date: 2024  Patient name: Sahyla Philip  : 1945  MRN: [id number]  Referring provider: Nelsy Mckoy, PT  Dx:   Encounter Diagnosis     ICD-10-CM    1. Urinary urgency  R39.15       2. Urinary frequency  R35.0       3. Muscle weakness  M62.81           Start Time: 1232  Stop Time: 1325  Total time in clinic (min): 53 minutes    Subjective: Pt did a lot of quilting this week and reports \"I had a really good week.\" She made a list of things she wanted to do everyday wether she has pain or not and she did them. Pt is taking vaginal valium during the day. Pt does get pain but it is better, more manageable. Pt is not having urinary symptoms. Pt has had less intense an frequency of pain. She feels like she has changed her attitude and if she does get pain she is no longer resorting to panic. Pt feels her L leg is getting stronger.      Objective: See treatment diary below      Assessment: Pt presents with improved coping Patient performed recumbent bike aerobic exercise to increase blood flow to the area being treated, prepare the muscles for strength training and stretching, improve overall tolerance to activity, and aerobic endurance. PT cued pt for squat to utilize hip hinge and post weight shift with daily activities. PT initiated sit to stand exercises with pt presenting with inc R knee valgus during this transfer, improved with visual cueing as well as tactile cueing to activate outer glutes during transfer. PT also initiated FSU with  use of 1 HHA with pt challenged on R>L due to weakness and instability.   Tolerated treatment well. Patient would benefit from continued PT      Plan: Continue per plan of care.      Precautions: hypertension   Impairments/functional limitations: urinary urgency, urinary frequency, nocturia   Daily Treatment Diary    Manuals    External assess          Internal assess      performed    Internal manual " "therapy          Objective assessment      performed    R QL stretch, R lateral trunk STM     8 mins     Hip IR PROM, post hip mobs   nv 6 mins R  np 6 mins R     Re-eval          Neuro Re-Ed          TAC          PF          TAC+PF          TAC with SLR  HEP HEP       TAC with s/l ABD  Review, x5 ea HEP       TAC with bridge  HEP HEP       TAC with clamshells   Review, 5\"x3 ea HEP       SLS   30\"x3 ea, intermittent UE support  30\"x2 ea with UE support 30\"x2 ea with 1 UE support  t 30\"x3 ea with intermittent UE support   3 way tap                              Hip hike to neutral   Review, x5 ea Review, LLE      sEMG biofeedback 35 mins Performed with DB, stretches, sitting, standing, supine  np        PNS education      Review, down regulate nervous system,strategies to manage pain  review   PFM checkins Review, issued         360 deg breathing Review, throughout session          Ther Ex          Bike 7 mins L1  8 mins L1 9 mins L1  10 mins L1  9 mins L1  10 mins L1   TM     nv               Seated happy baby  HEP   5\"x5 HEP     Seated hs stretch HEP   30\"x2 ea HEP     Seated piri stretch HEP  45\"x2 ea 30\"x2 ea 30\"x2 ea  30\"x2 ea   Supine butterfly stretch 1 min         Supine hip IR/ER AROM 3'x10         Self internal STM  Review, daily         Standing hip ABD   X10 ea X10 ea X15 ea  X10 ea   Standing hip ext   X10 ea X10 ea X15 ea  D/c   Standing march    X10 ea X10 ea X15 ea  D/c   Amb in clinic with and without SPC    90'x4 laps                 FSU with march       X10 ea with 1 HHA   Sit to stand       X15, cue for latral hip activation   Side stepping        2 laps at bar              Ther Activity          Squatting       Post weight shift   Heathy bladder habits  Water intake, inc by 8 oz, using a water bottle    REVIEW,  POTENTIAL BLADDER/BOWEL IRRITANTS                Urge delay: HR          Freeze, breathe, squeeze           Bowel mechanics/posture          Gait Training                            "   Modalities

## 2024-10-07 ENCOUNTER — OFFICE VISIT (OUTPATIENT)
Dept: PHYSICAL THERAPY | Facility: REHABILITATION | Age: 79
End: 2024-10-07
Payer: COMMERCIAL

## 2024-10-07 DIAGNOSIS — R35.0 URINARY FREQUENCY: ICD-10-CM

## 2024-10-07 DIAGNOSIS — M62.81 MUSCLE WEAKNESS: ICD-10-CM

## 2024-10-07 DIAGNOSIS — R39.15 URINARY URGENCY: Primary | ICD-10-CM

## 2024-10-07 PROCEDURE — 97140 MANUAL THERAPY 1/> REGIONS: CPT | Performed by: PHYSICAL THERAPIST

## 2024-10-07 NOTE — LETTER
2024    Tisha Ely MD  [address] [facility] [address]  [facility] [address]    Patient: Shayla Philip   YOB: 1945   Date of Visit: 10/7/2024     Encounter Diagnosis     ICD-10-CM    1. Urinary urgency  R39.15       2. Urinary frequency  R35.0       3. Muscle weakness  M62.81           Dear Dr. Ely:    Thank you for your recent referral of Shayla Philip. Please review the attached evaluation summary from Shayla's recent visit.     Please verify that you agree with the plan of care by signing the attached order.     If you have any questions or concerns, please do not hesitate to call.     I sincerely appreciate the opportunity to share in the care of one of your patients and hope to have another opportunity to work with you in the near future.       Sincerely,    Nelsy Mckoy, PT      Referring Provider:      I certify that I have read the below Plan of Care and certify the need for these services furnished under this plan of treatment while under my care.                    Tisha Ely MD  [address] [address]  Via Fax: [phone]          PT Re-Evaluation  and PT Discharge    Today's date: 10/7/2024  Patient name: Shayla Philip  : 1945  MRN: [id number]  Referring provider: Nelsy Mckoy, PT  Dx:   Encounter Diagnosis     ICD-10-CM    1. Urinary urgency  R39.15       2. Urinary frequency  R35.0       3. Muscle weakness  M62.81           Start Time: 1132  Stop Time: 1218  Total time in clinic (min): 46 minutes    Assessment  Impairments: impaired balance, impaired physical strength, lacks appropriate home exercise program, pain with function and emotional regulation  Symptom irritability: moderate    Assessment details: Pt presents overall improved function, is independent with HEP and is appropriate for d/c from skilled PT to manage symptoms independently and progress towards meeting LTG's. PT also rec discussing potential sleep  study with PCP to assess sleep disorder as a potential cause for urinary frequency at night.Pt is in agreement with this POC.       Understanding of Dx/Px/POC: good     Prognosis: good    Goals  Short term goals:  Pt will be able to delay urge at least 5 minutes in 6 weeks.- MET  Pt will demonstrate good understanding of urge delay techniques in 6 weeks - mET  Pt will extend intervoid period to 2-4 hours in 6 weeks.- MET    Long term goals:  Pt will be compliant with HEP by discharge. -MET  Pt will be able to delay urge for greater than 15 minutes to allow pt to walk in her neighborhood x20 mins for CV endurance and exercise purposes -MET urge goal, UNABLE TO ASSESS walking as pt has not tried this   Pt will present with nocturia 3 times a night or less - NOT MET  Pt will present with MMT 4/5 b/l LE all planes - MET      Plan    Treatment plan discussed with: patient  Plan details: Pt d/c'ed from skilled PT      PT Pelvic Floor Subjective:   History of Present Illness:   Pt reports she had a stroke on Tuesday last week. She was rushed to the hospital, given medication, was kept overnight and had a series of tests. She reports all of her tests were ok. Pt just saw PCP this morning and was told she was had no restrictions.She has not done her exercises his past week because of her stroke. She does not feel she has any residual effects of her stroke. She was told she is not sure of the cause of her stroke but they believe it may have been from a blood clot that the medication dissolved in time for her.    Pt notices improvement with PT.   Pt has no pain at night. She is not taking pain meds at night.  Most days she does not have as much urinary frequency at night. She voids 5 times a night with no pain. Pt feels she can live with that because she takes something to go back to sleep. Pt reports she has not had a sleep study. PT rec discussing this with PCP as a potential next step in managing nighttime voiding.   During  the day she feels pretty good. She gets busy, does not have urinary frequency. Pelvic pain during the day has not been present for the past week.     Because she still has some pain, she reports 70% improvement.   She will get pain occasionally during the day. It will occur after a BM.   She does not have pain with MM in the morning but it will occur after her second BM of the day and will sometimes have it be painfree and other times it will elicit but it does not last as long. If she rests afterwards, the pain will relieve.    Pt feels better able to manage her pain.            Social Support:     Lives in:  Multiple-level home    Lives with:  Spouse    Relationship status: /committed    Work status: retired    Life stress severity: moderate    History of Depression: yes    takes an anxiety pill  Diet and Exercise:    Diet:balanced nutrition    PT rec pt start with walking 5-10 mins, daily.  Pt is walking 5 mins without pain.  PT rec pt can trial adding in a second 5 min walk in her day as able.  OB/ gyn History    Gestational History:     Prior Pregnancy: Yes      Number of prior pregnancies: 3    Number of term pregnancies: 3    Delivery Type: vaginal delivery      Menstrual History:      Menopausal: menopause  Bladder Function:     Voiding Difficulties negative for: urgency, frequent urination, hesitancy, straining and incomplete emptying       Voiding Difficulties comments:     Voiding frequency: every 1-2 hours and every 3-4 hours (every 2-3 hours)    Urinary leakage: no urine leakage    Urinary leakage not aggravated by: post-void dribble    Nocturia (episodes per night): 4 or more (5 times a night)    Painful urination: No      Intake (ounces): Water: 32, Tea: 8,   Bowel Function:     Bowel Function comments:  Pt reports type 2 stools likely cause her discomfort. PT rec if she notice type 2 stool, to utilize relaxation based strategy after BM such as stretching, deep breathing to manage pain before  "it starts.     Bowel frequency: daily and multiple times a day    [place] Stool Scale: type 4 and type 2    Stool softener use: no stool softeners    Enema use: no enema    Uses \"squatty potty\": no Squatty Potty  Sexual Function:     Sexually Active:  Not sexually active  Diagnostic Tests: Pt reports diagnostic testing has been completed 2 years ago, unsure of what:  Treatments:     Current treatment: physical therapy    Patient Goals:     Other patient goals:  Get back to life and not pee all the time or have pain - PROGRESSING      Objective    Pt provided verbal consent prior to and throughout objective assessment     Lumbar AROM:  WFL except min limitation in b/l sidebending     SLS  L: fair load transfer, ipsilateral lean  R: fair load transfer, ipsilateral lean    Other Comments:   Good TAC  Good PFM, cueing to avoid holding her breath      MMT:   Left Right   Hip flex 4 4   Hip ABD 4 4   Hip ext nt nt   Hip IR 4 4+   Hip ER 4 4+   Knee flex 4+ 4+   Knee ext 4+ 4+   Ankle DF 4+ 4+               Special Tests:     Left Right   JANELLE  -  -   FADIR  -  -   Post Thigh Thrust  -  -     Pop angle  45 deg  50 deg    Hip IR PROM 34 deg 27 deg        Access Code: [email or web address]  URL: [email or web address]  Date: 10/07/2024  Prepared by: Nelsy Mckoy    Program Notes  Perform deep breathing with all stretches    Exercises  - Walking  - 1 x daily  - Seated Hamstring Stretch  - 1 x daily - 2 reps - 30 secs hold  - Seated Piriformis Stretch  - 1 x daily - 2 reps - 30 secs hold  - Seated Happy Baby With Trunk Flexion For Pelvic Relaxation  - 1 x daily - 5-10 reps - 5 secs hold  - Supine Butterfly Groin Stretch  - 1 x daily - 1 reps - 30-60 secs hold  - Supine Hip Internal and External Rotation  - 1 x daily - 10 reps - 5-10 secs hold  - Seated Sidebending Arms Overhead  - 1 x daily - 5 reps - 5 hold  - Single Leg Stance  - 1 x daily - 1 reps - 30 secs hold  - Supine Bridge  - 3 x weekly - 20 reps - 2-3 secs hold  - " "Sidelying Hip Abduction  - 3 x weekly - 20 reps  - Clamshell  - 3 x weekly - 20 reps - 5-10 secs hold  - Small Range Straight Leg Raise  - 3 x weekly - 20 reps  - Standing Hip Abduction with Counter Support  - 3 x weekly - 2 sets - 10 reps  - Runner's Climb  - 3 x weekly - 2 sets - 10 reps  - Sit to Stand with Arms Crossed  - 3 x weekly - 2 sets - 10 reps  - Side Stepping with Counter Support  - 3 x weekly - 5-10 reps           Precautions: hypertension   Impairments/functional limitations: urinary urgency, urinary frequency, nocturia   Daily Treatment Diary    Manuals 10/7    9/6 9/17 9/24   External assess          Internal assess      performed    Internal manual therapy          Objective assessment      performed    R QL stretch, R lateral trunk STM     8 mins     Hip IR PROM, post hip mobs      6 mins R     Re-eval performed         Neuro Re-Ed          TAC          PF          TAC+PF          TAC with SLR          TAC with s/l ABD          TAC with bridge          TAC with clamshells           SLS     30\"x2 ea with 1 UE support  t 30\"x3 ea with intermittent UE support   3 way tap                              Hip hike to neutral          sEMG biofeedback          PNS education      Review, down regulate nervous system,strategies to manage pain  review   PFM checkins          360 deg breathing          Ther Ex          Bike     9 mins L1  10 mins L1   TM     nv     HEP review Performed          Seated happy baby      HEP     Seated hs stretch     HEP     Seated piri stretch     30\"x2 ea  30\"x2 ea   Supine butterfly stretch          Supine hip IR/ER AROM          Self internal STM           Standing hip ABD     X15 ea  X10 ea   Standing hip ext     X15 ea  D/c   Standing march      X15 ea  D/c   Amb in clinic with and without SPC                    FSU with march X10 ea with 1 HHA   Sit to stand       X15, cue for latral hip activation   Side stepping        2 laps at bar              Ther Activity        "   Squatting       Post weight shift   Heathy bladder habits     REVIEW,  POTENTIAL BLADDER/BOWEL IRRITANTS                Urge delay: HR          Freeze, breathe, squeeze           Bowel mechanics/posture          Gait Training                              Modalities

## 2024-10-07 NOTE — PROGRESS NOTES
PT Re-Evaluation  and PT Discharge    Today's date: 10/7/2024  Patient name: Shayla Philip  : 1945  MRN: [id number]  Referring provider: Nelsy Mckoy, PT  Dx:   Encounter Diagnosis     ICD-10-CM    1. Urinary urgency  R39.15       2. Urinary frequency  R35.0       3. Muscle weakness  M62.81           Start Time: 1132  Stop Time: 1218  Total time in clinic (min): 46 minutes    Assessment  Impairments: impaired balance, impaired physical strength, lacks appropriate home exercise program, pain with function and emotional regulation  Symptom irritability: moderate    Assessment details: Pt presents overall improved function, is independent with HEP and is appropriate for d/c from skilled PT to manage symptoms independently and progress towards meeting LTG's. PT also rec discussing potential sleep study with PCP to assess sleep disorder as a potential cause for urinary frequency at night.Pt is in agreement with this POC.       Understanding of Dx/Px/POC: good     Prognosis: good    Goals  Short term goals:  Pt will be able to delay urge at least 5 minutes in 6 weeks.- MET  Pt will demonstrate good understanding of urge delay techniques in 6 weeks - mET  Pt will extend intervoid period to 2-4 hours in 6 weeks.- MET    Long term goals:  Pt will be compliant with HEP by discharge. -MET  Pt will be able to delay urge for greater than 15 minutes to allow pt to walk in her neighborhood x20 mins for CV endurance and exercise purposes -MET urge goal, UNABLE TO ASSESS walking as pt has not tried this   Pt will present with nocturia 3 times a night or less - NOT MET  Pt will present with MMT 4/5 b/l LE all planes - MET      Plan    Treatment plan discussed with: patient  Plan details: Pt d/c'ed from skilled PT      PT Pelvic Floor Subjective:   History of Present Illness:   Pt reports she had a stroke on Tuesday last week. She was rushed to the hospital, given medication, was kept overnight and had a series of tests.  She reports all of her tests were ok. Pt just saw PCP this morning and was told she was had no restrictions.She has not done her exercises his past week because of her stroke. She does not feel she has any residual effects of her stroke. She was told she is not sure of the cause of her stroke but they believe it may have been from a blood clot that the medication dissolved in time for her.    Pt notices improvement with PT.   Pt has no pain at night. She is not taking pain meds at night.  Most days she does not have as much urinary frequency at night. She voids 5 times a night with no pain. Pt feels she can live with that because she takes something to go back to sleep. Pt reports she has not had a sleep study. PT rec discussing this with PCP as a potential next step in managing nighttime voiding.   During the day she feels pretty good. She gets busy, does not have urinary frequency. Pelvic pain during the day has not been present for the past week.     Because she still has some pain, she reports 70% improvement.   She will get pain occasionally during the day. It will occur after a BM.   She does not have pain with MM in the morning but it will occur after her second BM of the day and will sometimes have it be painfree and other times it will elicit but it does not last as long. If she rests afterwards, the pain will relieve.    Pt feels better able to manage her pain.            Social Support:     Lives in:  Multiple-level home    Lives with:  Spouse    Relationship status: /committed    Work status: retired    Life stress severity: moderate    History of Depression: yes    takes an anxiety pill  Diet and Exercise:    Diet:balanced nutrition    PT rec pt start with walking 5-10 mins, daily.  Pt is walking 5 mins without pain.  PT rec pt can trial adding in a second 5 min walk in her day as able.  OB/ gyn History    Gestational History:     Prior Pregnancy: Yes      Number of prior pregnancies: 3    Number  "of term pregnancies: 3    Delivery Type: vaginal delivery      Menstrual History:      Menopausal: menopause  Bladder Function:     Voiding Difficulties negative for: urgency, frequent urination, hesitancy, straining and incomplete emptying       Voiding Difficulties comments:     Voiding frequency: every 1-2 hours and every 3-4 hours (every 2-3 hours)    Urinary leakage: no urine leakage    Urinary leakage not aggravated by: post-void dribble    Nocturia (episodes per night): 4 or more (5 times a night)    Painful urination: No      Intake (ounces): Water: 32, Tea: 8,   Bowel Function:     Bowel Function comments:  Pt reports type 2 stools likely cause her discomfort. PT rec if she notice type 2 stool, to utilize relaxation based strategy after BM such as stretching, deep breathing to manage pain before it starts.     Bowel frequency: daily and multiple times a day    [place] Stool Scale: type 4 and type 2    Stool softener use: no stool softeners    Enema use: no enema    Uses \"squatty potty\": no Squatty Potty  Sexual Function:     Sexually Active:  Not sexually active  Diagnostic Tests: Pt reports diagnostic testing has been completed 2 years ago, unsure of what:  Treatments:     Current treatment: physical therapy    Patient Goals:     Other patient goals:  Get back to life and not pee all the time or have pain - PROGRESSING      Objective    Pt provided verbal consent prior to and throughout objective assessment     Lumbar AROM:  WFL except min limitation in b/l sidebending     SLS  L: fair load transfer, ipsilateral lean  R: fair load transfer, ipsilateral lean    Other Comments:   Good TAC  Good PFM, cueing to avoid holding her breath      MMT:   Left Right   Hip flex 4 4   Hip ABD 4 4   Hip ext nt nt   Hip IR 4 4+   Hip ER 4 4+   Knee flex 4+ 4+   Knee ext 4+ 4+   Ankle DF 4+ 4+               Special Tests:     Left Right   JANELLE  -  -   FADIR  -  -   Post Thigh Thrust  -  -     Pop angle  45 deg  50 deg    " "Hip IR PROM 34 deg 27 deg        Access Code: [email or web address]  URL: [email or web address]  Date: 10/07/2024  Prepared by: Nelsy Mckoy    Program Notes  Perform deep breathing with all stretches    Exercises  - Walking  - 1 x daily  - Seated Hamstring Stretch  - 1 x daily - 2 reps - 30 secs hold  - Seated Piriformis Stretch  - 1 x daily - 2 reps - 30 secs hold  - Seated Happy Baby With Trunk Flexion For Pelvic Relaxation  - 1 x daily - 5-10 reps - 5 secs hold  - Supine Butterfly Groin Stretch  - 1 x daily - 1 reps - 30-60 secs hold  - Supine Hip Internal and External Rotation  - 1 x daily - 10 reps - 5-10 secs hold  - Seated Sidebending Arms Overhead  - 1 x daily - 5 reps - 5 hold  - Single Leg Stance  - 1 x daily - 1 reps - 30 secs hold  - Supine Bridge  - 3 x weekly - 20 reps - 2-3 secs hold  - Sidelying Hip Abduction  - 3 x weekly - 20 reps  - Clamshell  - 3 x weekly - 20 reps - 5-10 secs hold  - Small Range Straight Leg Raise  - 3 x weekly - 20 reps  - Standing Hip Abduction with Counter Support  - 3 x weekly - 2 sets - 10 reps  - Runner's Climb  - 3 x weekly - 2 sets - 10 reps  - Sit to Stand with Arms Crossed  - 3 x weekly - 2 sets - 10 reps  - Side Stepping with Counter Support  - 3 x weekly - 5-10 reps           Precautions: hypertension   Impairments/functional limitations: urinary urgency, urinary frequency, nocturia   Daily Treatment Diary    Manuals 10/7    9/6 9/17 9/24   External assess          Internal assess      performed    Internal manual therapy          Objective assessment      performed    R QL stretch, R lateral trunk STM     8 mins     Hip IR PROM, post hip mobs      6 mins R     Re-eval performed         Neuro Re-Ed          TAC          PF          TAC+PF          TAC with SLR          TAC with s/l ABD          TAC with bridge          TAC with clamshells           SLS     30\"x2 ea with 1 UE support  t 30\"x3 ea with intermittent UE support   3 way tap                            " "  Hip hike to neutral          sEMG biofeedback          PNS education      Review, down regulate nervous system,strategies to manage pain  review   PFM checkins          360 deg breathing          Ther Ex          Bike     9 mins L1  10 mins L1   TM     nv     HEP review Performed          Seated happy baby      HEP     Seated hs stretch     HEP     Seated piri stretch     30\"x2 ea  30\"x2 ea   Supine butterfly stretch          Supine hip IR/ER AROM          Self internal STM           Standing hip ABD     X15 ea  X10 ea   Standing hip ext     X15 ea  D/c   Standing march      X15 ea  D/c   Amb in clinic with and without SPC                    FSU with march       X10 ea with 1 HHA   Sit to stand       X15, cue for latral hip activation   Side stepping        2 laps at bar              Ther Activity          Squatting       Post weight shift   Heathy bladder habits     REVIEW,  POTENTIAL BLADDER/BOWEL IRRITANTS                Urge delay: HR          Freeze, breathe, squeeze           Bowel mechanics/posture          Gait Training                              Modalities                                                          "

## 2024-10-07 NOTE — LETTER
2024      No Recipients    Patient: Shayla Philip   YOB: 1945   Date of Visit: 10/7/2024     Encounter Diagnosis     ICD-10-CM    1. Urinary urgency  R39.15       2. Urinary frequency  R35.0       3. Muscle weakness  M62.81           Dear Dr. Ely:    Thank you for your recent referral of Shayla Philip. Please review the attached evaluation summary from Shayla's recent visit.     Please verify that you agree with the plan of care by signing the attached order.     If you have any questions or concerns, please do not hesitate to call.     I sincerely appreciate the opportunity to share in the care of one of your patients and hope to have another opportunity to work with you in the near future.       Sincerely,    Nelsy Mckoy, PT      Referring Provider:      I certify that I have read the below Plan of Care and certify the need for these services furnished under this plan of treatment while under my care.                    Tisha Ely MD  [address] [address]  Via Fax: [phone]          PT Re-Evaluation  and PT Discharge    Today's date: 10/7/2024  Patient name: Shayla Philip  : 1945  MRN: [id number]  Referring provider: Nelsy Mckoy, PT  Dx:   Encounter Diagnosis     ICD-10-CM    1. Urinary urgency  R39.15       2. Urinary frequency  R35.0       3. Muscle weakness  M62.81           Start Time: 1132  Stop Time: 1218  Total time in clinic (min): 46 minutes    Assessment  Impairments: impaired balance, impaired physical strength, lacks appropriate home exercise program, pain with function and emotional regulation  Symptom irritability: moderate    Assessment details: Pt presents overall improved function, is independent with HEP and is appropriate for d/c from skilled PT to manage symptoms independently and progress towards meeting LTG's. PT also rec discussing potential sleep study with PCP to assess sleep disorder as a potential cause  for urinary frequency at night.Pt is in agreement with this POC.       Understanding of Dx/Px/POC: good     Prognosis: good    Goals  Short term goals:  Pt will be able to delay urge at least 5 minutes in 6 weeks.- MET  Pt will demonstrate good understanding of urge delay techniques in 6 weeks - mET  Pt will extend intervoid period to 2-4 hours in 6 weeks.- MET    Long term goals:  Pt will be compliant with HEP by discharge. -MET  Pt will be able to delay urge for greater than 15 minutes to allow pt to walk in her neighborhood x20 mins for CV endurance and exercise purposes -MET urge goal, UNABLE TO ASSESS walking as pt has not tried this   Pt will present with nocturia 3 times a night or less - NOT MET  Pt will present with MMT 4/5 b/l LE all planes - MET      Plan    Treatment plan discussed with: patient  Plan details: Pt d/c'ed from skilled PT      PT Pelvic Floor Subjective:   History of Present Illness:   Pt reports she had a stroke on Tuesday last week. She was rushed to the hospital, given medication, was kept overnight and had a series of tests. She reports all of her tests were ok. Pt just saw PCP this morning and was told she was had no restrictions.She has not done her exercises his past week because of her stroke. She does not feel she has any residual effects of her stroke. She was told she is not sure of the cause of her stroke but they believe it may have been from a blood clot that the medication dissolved in time for her.    Pt notices improvement with PT.   Pt has no pain at night. She is not taking pain meds at night.  Most days she does not have as much urinary frequency at night. She voids 5 times a night with no pain. Pt feels she can live with that because she takes something to go back to sleep. Pt reports she has not had a sleep study. PT rec discussing this with PCP as a potential next step in managing nighttime voiding.   During the day she feels pretty good. She gets busy, does not have  urinary frequency. Pelvic pain during the day has not been present for the past week.     Because she still has some pain, she reports 70% improvement.   She will get pain occasionally during the day. It will occur after a BM.   She does not have pain with MM in the morning but it will occur after her second BM of the day and will sometimes have it be painfree and other times it will elicit but it does not last as long. If she rests afterwards, the pain will relieve.    Pt feels better able to manage her pain.            Social Support:     Lives in:  Multiple-level home    Lives with:  Spouse    Relationship status: /committed    Work status: retired    Life stress severity: moderate    History of Depression: yes    takes an anxiety pill  Diet and Exercise:    Diet:balanced nutrition    PT rec pt start with walking 5-10 mins, daily.  Pt is walking 5 mins without pain.  PT rec pt can trial adding in a second 5 min walk in her day as able.  OB/ gyn History    Gestational History:     Prior Pregnancy: Yes      Number of prior pregnancies: 3    Number of term pregnancies: 3    Delivery Type: vaginal delivery      Menstrual History:      Menopausal: menopause  Bladder Function:     Voiding Difficulties negative for: urgency, frequent urination, hesitancy, straining and incomplete emptying       Voiding Difficulties comments:     Voiding frequency: every 1-2 hours and every 3-4 hours (every 2-3 hours)    Urinary leakage: no urine leakage    Urinary leakage not aggravated by: post-void dribble    Nocturia (episodes per night): 4 or more (5 times a night)    Painful urination: No      Intake (ounces): Water: 32, Tea: 8,   Bowel Function:     Bowel Function comments:  Pt reports type 2 stools likely cause her discomfort. PT rec if she notice type 2 stool, to utilize relaxation based strategy after BM such as stretching, deep breathing to manage pain before it starts.     Bowel frequency: daily and multiple times a  "day    Carbon Stool Scale: type 4 and type 2    Stool softener use: no stool softeners    Enema use: no enema    Uses \"squatty potty\": no Squatty Potty  Sexual Function:     Sexually Active:  Not sexually active  Diagnostic Tests: Pt reports diagnostic testing has been completed 2 years ago, unsure of what:  Treatments:     Current treatment: physical therapy    Patient Goals:     Other patient goals:  Get back to life and not pee all the time or have pain - PROGRESSING      Objective    Pt provided verbal consent prior to and throughout objective assessment     Lumbar AROM:  WFL except min limitation in b/l sidebending     SLS  L: fair load transfer, ipsilateral lean  R: fair load transfer, ipsilateral lean    Other Comments:   Good TAC  Good PFM, cueing to avoid holding her breath      MMT:   Left Right   Hip flex 4 4   Hip ABD 4 4   Hip ext nt nt   Hip IR 4 4+   Hip ER 4 4+   Knee flex 4+ 4+   Knee ext 4+ 4+   Ankle DF 4+ 4+               Special Tests:     Left Right   JANELLE  -  -   FADIR  -  -   Post Thigh Thrust  -  -     Pop angle  45 deg  50 deg    Hip IR PROM 34 deg 27 deg        Access Code: [email or web address]  URL: [email or web address]  Date: 10/07/2024  Prepared by: Nelsy Mckoy    Program Notes  Perform deep breathing with all stretches    Exercises  - Walking  - 1 x daily  - Seated Hamstring Stretch  - 1 x daily - 2 reps - 30 secs hold  - Seated Piriformis Stretch  - 1 x daily - 2 reps - 30 secs hold  - Seated Happy Baby With Trunk Flexion For Pelvic Relaxation  - 1 x daily - 5-10 reps - 5 secs hold  - Supine Butterfly Groin Stretch  - 1 x daily - 1 reps - 30-60 secs hold  - Supine Hip Internal and External Rotation  - 1 x daily - 10 reps - 5-10 secs hold  - Seated Sidebending Arms Overhead  - 1 x daily - 5 reps - 5 hold  - Single Leg Stance  - 1 x daily - 1 reps - 30 secs hold  - Supine Bridge  - 3 x weekly - 20 reps - 2-3 secs hold  - Sidelying Hip Abduction  - 3 x weekly - 20 reps  - Clamshell " " - 3 x weekly - 20 reps - 5-10 secs hold  - Small Range Straight Leg Raise  - 3 x weekly - 20 reps  - Standing Hip Abduction with Counter Support  - 3 x weekly - 2 sets - 10 reps  - Runner's Climb  - 3 x weekly - 2 sets - 10 reps  - Sit to Stand with Arms Crossed  - 3 x weekly - 2 sets - 10 reps  - Side Stepping with Counter Support  - 3 x weekly - 5-10 reps           Precautions: hypertension   Impairments/functional limitations: urinary urgency, urinary frequency, nocturia   Daily Treatment Diary    Manuals 10/7    9/6 9/17 9/24   External assess          Internal assess      performed    Internal manual therapy          Objective assessment      performed    R QL stretch, R lateral trunk STM     8 mins     Hip IR PROM, post hip mobs      6 mins R     Re-eval performed         Neuro Re-Ed          TAC          PF          TAC+PF          TAC with SLR          TAC with s/l ABD          TAC with bridge          TAC with clamshells           SLS     30\"x2 ea with 1 UE support  t 30\"x3 ea with intermittent UE support   3 way tap                              Hip hike to neutral          sEMG biofeedback          PNS education      Review, down regulate nervous system,strategies to manage pain  review   PFM checkins          360 deg breathing          Ther Ex          Bike     9 mins L1  10 mins L1   TM     nv     HEP review Performed          Seated happy baby      HEP     Seated hs stretch     HEP     Seated piri stretch     30\"x2 ea  30\"x2 ea   Supine butterfly stretch          Supine hip IR/ER AROM          Self internal STM           Standing hip ABD     X15 ea  X10 ea   Standing hip ext     X15 ea  D/c   Standing march      X15 ea  D/c   Amb in clinic with and without SPC                    FSU with march X10 ea with 1 HHA   Sit to stand       X15, cue for latral hip activation   Side stepping        2 laps at bar              Ther Activity          Squatting       Post weight shift   Heathy bladder habits   "   REVIEW,  POTENTIAL BLADDER/BOWEL IRRITANTS                Urge delay: HR          Freeze, breathe, squeeze           Bowel mechanics/posture          Gait Training                              Modalities

## 2024-10-15 ENCOUNTER — APPOINTMENT (OUTPATIENT)
Dept: PHYSICAL THERAPY | Facility: REHABILITATION | Age: 79
End: 2024-10-15
Payer: COMMERCIAL

## 2024-10-23 ENCOUNTER — APPOINTMENT (OUTPATIENT)
Dept: PHYSICAL THERAPY | Facility: REHABILITATION | Age: 79
End: 2024-10-23
Payer: COMMERCIAL